# Patient Record
Sex: FEMALE | Race: WHITE | NOT HISPANIC OR LATINO | Employment: OTHER | RURAL
[De-identification: names, ages, dates, MRNs, and addresses within clinical notes are randomized per-mention and may not be internally consistent; named-entity substitution may affect disease eponyms.]

---

## 2018-10-25 ENCOUNTER — HISTORICAL (OUTPATIENT)
Dept: ADMINISTRATIVE | Facility: HOSPITAL | Age: 63
End: 2018-10-25

## 2018-10-26 LAB
LAB AP CLINICAL INFORMATION: NORMAL
LAB AP DIAGNOSIS - HISTORICAL: NORMAL
LAB AP GROSS PATHOLOGY - HISTORICAL: NORMAL
LAB AP SPECIMEN SUBMITTED - HISTORICAL: NORMAL

## 2018-10-31 ENCOUNTER — HISTORICAL (OUTPATIENT)
Dept: ADMINISTRATIVE | Facility: HOSPITAL | Age: 63
End: 2018-10-31

## 2020-06-25 ENCOUNTER — HISTORICAL (OUTPATIENT)
Dept: ADMINISTRATIVE | Facility: HOSPITAL | Age: 65
End: 2020-06-25

## 2021-04-28 RX ORDER — ASPIRIN 81 MG/1
81 TABLET ORAL DAILY
COMMUNITY

## 2021-04-28 RX ORDER — LOSARTAN POTASSIUM AND HYDROCHLOROTHIAZIDE 12.5; 1 MG/1; MG/1
1 TABLET ORAL DAILY
COMMUNITY
End: 2021-07-01 | Stop reason: SDUPTHER

## 2021-04-28 RX ORDER — TRIMETHOBENZAMIDE HCL 300 MG
300 CAPSULE ORAL EVERY 8 HOURS PRN
COMMUNITY

## 2021-04-28 RX ORDER — PANTOPRAZOLE SODIUM 40 MG/1
40 TABLET, DELAYED RELEASE ORAL DAILY
COMMUNITY
End: 2021-05-04 | Stop reason: SDUPTHER

## 2021-04-28 RX ORDER — METOPROLOL SUCCINATE 50 MG/1
50 TABLET, EXTENDED RELEASE ORAL DAILY
COMMUNITY
End: 2021-07-01 | Stop reason: SDUPTHER

## 2021-04-28 RX ORDER — DULOXETIN HYDROCHLORIDE 60 MG/1
60 CAPSULE, DELAYED RELEASE ORAL NIGHTLY
COMMUNITY
End: 2022-02-09 | Stop reason: SDUPTHER

## 2021-04-28 RX ORDER — FERROUS SULFATE 325(65) MG
325 TABLET ORAL DAILY
COMMUNITY

## 2021-04-28 RX ORDER — CLOPIDOGREL BISULFATE 75 MG/1
75 TABLET ORAL DAILY
Status: ON HOLD | COMMUNITY
End: 2021-05-29 | Stop reason: HOSPADM

## 2021-04-28 RX ORDER — LANOLIN ALCOHOL/MO/W.PET/CERES
1000 CREAM (GRAM) TOPICAL DAILY
COMMUNITY

## 2021-04-28 RX ORDER — ACETAMINOPHEN 500 MG
1 TABLET ORAL DAILY
COMMUNITY

## 2021-05-04 RX ORDER — PANTOPRAZOLE SODIUM 40 MG/1
40 TABLET, DELAYED RELEASE ORAL DAILY
Qty: 30 TABLET | Refills: 1 | Status: SHIPPED | OUTPATIENT
Start: 2021-05-04 | End: 2021-08-09 | Stop reason: SDUPTHER

## 2021-05-27 ENCOUNTER — HOSPITAL ENCOUNTER (INPATIENT)
Facility: HOSPITAL | Age: 66
LOS: 1 days | Discharge: HOME | DRG: 247 | End: 2021-05-29
Attending: STUDENT IN AN ORGANIZED HEALTH CARE EDUCATION/TRAINING PROGRAM | Admitting: INTERNAL MEDICINE
Payer: MEDICARE

## 2021-05-27 DIAGNOSIS — F17.200 TOBACCO USE DISORDER: ICD-10-CM

## 2021-05-27 DIAGNOSIS — R07.9 CHEST PAIN: ICD-10-CM

## 2021-05-27 DIAGNOSIS — I65.29 CAROTID STENOSIS: ICD-10-CM

## 2021-05-27 DIAGNOSIS — G45.1 CAROTID INSUFFICIENCY: ICD-10-CM

## 2021-05-27 DIAGNOSIS — J44.9 CHRONIC OBSTRUCTIVE PULMONARY DISEASE, UNSPECIFIED COPD TYPE: ICD-10-CM

## 2021-05-27 DIAGNOSIS — I10 ESSENTIAL HYPERTENSION: ICD-10-CM

## 2021-05-27 DIAGNOSIS — I65.23 BILATERAL CAROTID ARTERY STENOSIS: ICD-10-CM

## 2021-05-27 DIAGNOSIS — J44.9 COPD (CHRONIC OBSTRUCTIVE PULMONARY DISEASE): ICD-10-CM

## 2021-05-27 DIAGNOSIS — I21.4 NSTEMI (NON-ST ELEVATED MYOCARDIAL INFARCTION): Primary | ICD-10-CM

## 2021-05-27 DIAGNOSIS — I25.10 CAD (CORONARY ATHEROSCLEROTIC DISEASE): ICD-10-CM

## 2021-05-27 LAB
ALBUMIN SERPL BCP-MCNC: 3.7 G/DL (ref 3.5–5)
ALBUMIN/GLOB SERPL: 1.1 {RATIO}
ALP SERPL-CCNC: 94 U/L (ref 55–142)
ALT SERPL W P-5'-P-CCNC: 17 U/L (ref 13–56)
ANION GAP SERPL CALCULATED.3IONS-SCNC: 13 MMOL/L (ref 7–16)
AST SERPL W P-5'-P-CCNC: 14 U/L (ref 15–37)
BASOPHILS # BLD AUTO: 0.06 K/UL (ref 0–0.2)
BASOPHILS NFR BLD AUTO: 0.6 % (ref 0–1)
BILIRUB SERPL-MCNC: 0.3 MG/DL (ref 0–1.2)
BUN SERPL-MCNC: 16 MG/DL (ref 7–18)
BUN/CREAT SERPL: 13 (ref 6–20)
CALCIUM SERPL-MCNC: 9.1 MG/DL (ref 8.5–10.1)
CHLORIDE SERPL-SCNC: 100 MMOL/L (ref 98–107)
CO2 SERPL-SCNC: 33 MMOL/L (ref 21–32)
CREAT SERPL-MCNC: 1.26 MG/DL (ref 0.55–1.02)
DIFFERENTIAL METHOD BLD: ABNORMAL
EOSINOPHIL # BLD AUTO: 0.12 K/UL (ref 0–0.5)
EOSINOPHIL NFR BLD AUTO: 1.1 % (ref 1–4)
ERYTHROCYTE [DISTWIDTH] IN BLOOD BY AUTOMATED COUNT: 13.8 % (ref 11.5–14.5)
GLOBULIN SER-MCNC: 3.3 G/DL (ref 2–4)
GLUCOSE SERPL-MCNC: 122 MG/DL (ref 74–106)
HCT VFR BLD AUTO: 36.7 % (ref 38–47)
HGB BLD-MCNC: 12.1 G/DL (ref 12–16)
IMM GRANULOCYTES # BLD AUTO: 0.06 K/UL (ref 0–0.04)
IMM GRANULOCYTES NFR BLD: 0.6 % (ref 0–0.4)
LYMPHOCYTES # BLD AUTO: 2.47 K/UL (ref 1–4.8)
LYMPHOCYTES NFR BLD AUTO: 23.5 % (ref 27–41)
MCH RBC QN AUTO: 29.5 PG (ref 27–31)
MCHC RBC AUTO-ENTMCNC: 33 G/DL (ref 32–36)
MCV RBC AUTO: 89.5 FL (ref 80–96)
MONOCYTES # BLD AUTO: 0.65 K/UL (ref 0–0.8)
MONOCYTES NFR BLD AUTO: 6.2 % (ref 2–6)
MPC BLD CALC-MCNC: 10.2 FL (ref 9.4–12.4)
NEUTROPHILS # BLD AUTO: 7.14 K/UL (ref 1.8–7.7)
NEUTROPHILS NFR BLD AUTO: 68 % (ref 53–65)
NRBC # BLD AUTO: 0 X10E3/UL
NRBC, AUTO (.00): 0 %
NT-PROBNP SERPL-MCNC: 2679 PG/ML (ref 1–125)
PLATELET # BLD AUTO: 208 K/UL (ref 150–400)
POTASSIUM SERPL-SCNC: 3.7 MMOL/L (ref 3.5–5.1)
PROT SERPL-MCNC: 7 G/DL (ref 6.4–8.2)
RBC # BLD AUTO: 4.1 M/UL (ref 4.2–5.4)
SODIUM SERPL-SCNC: 142 MMOL/L (ref 136–145)
TROPONIN I SERPL-MCNC: 0.23 NG/ML
WBC # BLD AUTO: 10.5 K/UL (ref 4.5–11)

## 2021-05-27 PROCEDURE — 25000003 PHARM REV CODE 250: Performed by: EMERGENCY MEDICINE

## 2021-05-27 PROCEDURE — 36415 COLL VENOUS BLD VENIPUNCTURE: CPT | Performed by: STUDENT IN AN ORGANIZED HEALTH CARE EDUCATION/TRAINING PROGRAM

## 2021-05-27 PROCEDURE — 93010 EKG 12-LEAD: ICD-10-PCS | Mod: ,,, | Performed by: STUDENT IN AN ORGANIZED HEALTH CARE EDUCATION/TRAINING PROGRAM

## 2021-05-27 PROCEDURE — 36415 COLL VENOUS BLD VENIPUNCTURE: CPT | Performed by: EMERGENCY MEDICINE

## 2021-05-27 PROCEDURE — 93010 ELECTROCARDIOGRAM REPORT: CPT | Mod: ,,, | Performed by: STUDENT IN AN ORGANIZED HEALTH CARE EDUCATION/TRAINING PROGRAM

## 2021-05-27 PROCEDURE — 85025 COMPLETE CBC W/AUTO DIFF WBC: CPT | Performed by: EMERGENCY MEDICINE

## 2021-05-27 PROCEDURE — 99285 PR EMERGENCY DEPT VISIT,LEVEL V: ICD-10-PCS | Mod: ,,, | Performed by: EMERGENCY MEDICINE

## 2021-05-27 PROCEDURE — 99285 EMERGENCY DEPT VISIT HI MDM: CPT | Mod: ,,, | Performed by: EMERGENCY MEDICINE

## 2021-05-27 PROCEDURE — 80053 COMPREHEN METABOLIC PANEL: CPT | Performed by: EMERGENCY MEDICINE

## 2021-05-27 PROCEDURE — 83880 ASSAY OF NATRIURETIC PEPTIDE: CPT | Performed by: EMERGENCY MEDICINE

## 2021-05-27 PROCEDURE — 84484 ASSAY OF TROPONIN QUANT: CPT | Performed by: EMERGENCY MEDICINE

## 2021-05-27 PROCEDURE — 99285 EMERGENCY DEPT VISIT HI MDM: CPT | Mod: 25

## 2021-05-27 PROCEDURE — 93005 ELECTROCARDIOGRAM TRACING: CPT

## 2021-05-27 RX ORDER — HYDRALAZINE HYDROCHLORIDE 10 MG/1
TABLET, FILM COATED ORAL
Qty: 90 TABLET | Refills: 3 | Status: SHIPPED | OUTPATIENT
Start: 2021-05-27 | End: 2021-07-01

## 2021-05-27 RX ORDER — ASPIRIN 325 MG
325 TABLET ORAL
Status: COMPLETED | OUTPATIENT
Start: 2021-05-27 | End: 2021-05-27

## 2021-05-27 RX ADMIN — ASPIRIN 325 MG ORAL TABLET 325 MG: 325 PILL ORAL at 07:05

## 2021-05-28 PROBLEM — I65.23 BILATERAL CAROTID ARTERY STENOSIS: Status: ACTIVE | Noted: 2021-05-28

## 2021-05-28 PROBLEM — I21.4 NSTEMI (NON-ST ELEVATED MYOCARDIAL INFARCTION): Status: ACTIVE | Noted: 2021-05-28

## 2021-05-28 PROBLEM — J44.9 COPD (CHRONIC OBSTRUCTIVE PULMONARY DISEASE): Status: ACTIVE | Noted: 2021-05-28

## 2021-05-28 PROBLEM — I10 ESSENTIAL HYPERTENSION: Status: ACTIVE | Noted: 2021-05-28

## 2021-05-28 LAB
APTT PPP: 21.2 SECONDS (ref 25.2–37.3)
APTT PPP: 36 SECONDS (ref 25.2–37.3)
APTT PPP: 40.5 SECONDS (ref 25.2–37.3)
APTT PPP: >200 SECONDS (ref 25.2–37.3)
BASOPHILS # BLD AUTO: 0.04 K/UL (ref 0–0.2)
BASOPHILS # BLD AUTO: 0.05 K/UL (ref 0–0.2)
BASOPHILS NFR BLD AUTO: 0.4 % (ref 0–1)
BASOPHILS NFR BLD AUTO: 0.5 % (ref 0–1)
CATH EF QUANTITATIVE: 65 %
DIFFERENTIAL METHOD BLD: ABNORMAL
DIFFERENTIAL METHOD BLD: ABNORMAL
EOSINOPHIL # BLD AUTO: 0.11 K/UL (ref 0–0.5)
EOSINOPHIL # BLD AUTO: 0.11 K/UL (ref 0–0.5)
EOSINOPHIL NFR BLD AUTO: 1.1 % (ref 1–4)
EOSINOPHIL NFR BLD AUTO: 1.2 % (ref 1–4)
ERYTHROCYTE [DISTWIDTH] IN BLOOD BY AUTOMATED COUNT: 13.9 % (ref 11.5–14.5)
ERYTHROCYTE [DISTWIDTH] IN BLOOD BY AUTOMATED COUNT: 13.9 % (ref 11.5–14.5)
GLUCOSE SERPL-MCNC: 109 MG/DL (ref 70–105)
HCT VFR BLD AUTO: 34.9 % (ref 38–47)
HCT VFR BLD AUTO: 35.4 % (ref 38–47)
HGB BLD-MCNC: 11.4 G/DL (ref 12–16)
HGB BLD-MCNC: 11.4 G/DL (ref 12–16)
IMM GRANULOCYTES # BLD AUTO: 0.04 K/UL (ref 0–0.04)
IMM GRANULOCYTES # BLD AUTO: 0.05 K/UL (ref 0–0.04)
IMM GRANULOCYTES NFR BLD: 0.4 % (ref 0–0.4)
IMM GRANULOCYTES NFR BLD: 0.5 % (ref 0–0.4)
INR BLD: 0.97 (ref 0.9–1.1)
LYMPHOCYTES # BLD AUTO: 2.42 K/UL (ref 1–4.8)
LYMPHOCYTES # BLD AUTO: 2.56 K/UL (ref 1–4.8)
LYMPHOCYTES NFR BLD AUTO: 25 % (ref 27–41)
LYMPHOCYTES NFR BLD AUTO: 27.7 % (ref 27–41)
MCH RBC QN AUTO: 29.5 PG (ref 27–31)
MCH RBC QN AUTO: 29.7 PG (ref 27–31)
MCHC RBC AUTO-ENTMCNC: 32.2 G/DL (ref 32–36)
MCHC RBC AUTO-ENTMCNC: 32.7 G/DL (ref 32–36)
MCV RBC AUTO: 90.2 FL (ref 80–96)
MCV RBC AUTO: 92.2 FL (ref 80–96)
MONOCYTES # BLD AUTO: 0.46 K/UL (ref 0–0.8)
MONOCYTES # BLD AUTO: 0.58 K/UL (ref 0–0.8)
MONOCYTES NFR BLD AUTO: 4.8 % (ref 2–6)
MONOCYTES NFR BLD AUTO: 6.3 % (ref 2–6)
MPC BLD CALC-MCNC: 9.7 FL (ref 9.4–12.4)
MPC BLD CALC-MCNC: 9.8 FL (ref 9.4–12.4)
NEUTROPHILS # BLD AUTO: 5.89 K/UL (ref 1.8–7.7)
NEUTROPHILS # BLD AUTO: 6.59 K/UL (ref 1.8–7.7)
NEUTROPHILS NFR BLD AUTO: 63.9 % (ref 53–65)
NEUTROPHILS NFR BLD AUTO: 68.2 % (ref 53–65)
NRBC # BLD AUTO: 0 X10E3/UL
NRBC # BLD AUTO: 0 X10E3/UL
NRBC, AUTO (.00): 0 %
NRBC, AUTO (.00): 0 %
PLATELET # BLD AUTO: 164 K/UL (ref 150–400)
PLATELET # BLD AUTO: 173 K/UL (ref 150–400)
PROTHROMBIN TIME: 12.4 SECONDS (ref 11.7–14.7)
RBC # BLD AUTO: 3.84 M/UL (ref 4.2–5.4)
RBC # BLD AUTO: 3.87 M/UL (ref 4.2–5.4)
TROPONIN I SERPL-MCNC: 0.29 NG/ML
WBC # BLD AUTO: 9.23 K/UL (ref 4.5–11)
WBC # BLD AUTO: 9.67 K/UL (ref 4.5–11)

## 2021-05-28 PROCEDURE — C1887 CATHETER, GUIDING: HCPCS | Performed by: INTERNAL MEDICINE

## 2021-05-28 PROCEDURE — 36415 COLL VENOUS BLD VENIPUNCTURE: CPT | Performed by: EMERGENCY MEDICINE

## 2021-05-28 PROCEDURE — C1874 STENT, COATED/COV W/DEL SYS: HCPCS | Performed by: INTERNAL MEDICINE

## 2021-05-28 PROCEDURE — 27100168 OPTIME MED/SURG SUP & DEVICES NON-STERILE SUPPLY: Performed by: INTERNAL MEDICINE

## 2021-05-28 PROCEDURE — 82962 GLUCOSE BLOOD TEST: CPT

## 2021-05-28 PROCEDURE — 85025 COMPLETE CBC W/AUTO DIFF WBC: CPT | Performed by: EMERGENCY MEDICINE

## 2021-05-28 PROCEDURE — 94761 N-INVAS EAR/PLS OXIMETRY MLT: CPT

## 2021-05-28 PROCEDURE — 99153 MOD SED SAME PHYS/QHP EA: CPT | Performed by: INTERNAL MEDICINE

## 2021-05-28 PROCEDURE — 92978 ENDOLUMINL IVUS OCT C 1ST: CPT | Performed by: INTERNAL MEDICINE

## 2021-05-28 PROCEDURE — 93005 ELECTROCARDIOGRAM TRACING: CPT

## 2021-05-28 PROCEDURE — 93458 L HRT ARTERY/VENTRICLE ANGIO: CPT | Performed by: INTERNAL MEDICINE

## 2021-05-28 PROCEDURE — 99233 PR SUBSEQUENT HOSPITAL CARE,LEVL III: ICD-10-PCS | Mod: ,,, | Performed by: HOSPITALIST

## 2021-05-28 PROCEDURE — C1894 INTRO/SHEATH, NON-LASER: HCPCS | Performed by: INTERNAL MEDICINE

## 2021-05-28 PROCEDURE — 27000221 HC OXYGEN, UP TO 24 HOURS

## 2021-05-28 PROCEDURE — 99233 SBSQ HOSP IP/OBS HIGH 50: CPT | Mod: ,,, | Performed by: HOSPITALIST

## 2021-05-28 PROCEDURE — C1753 CATH, INTRAVAS ULTRASOUND: HCPCS | Performed by: INTERNAL MEDICINE

## 2021-05-28 PROCEDURE — G0378 HOSPITAL OBSERVATION PER HR: HCPCS

## 2021-05-28 PROCEDURE — 93458 PR CATH PLACE/CORON ANGIO, IMG SUPER/INTERP,W LEFT HEART VENTRICULOGRAPHY: ICD-10-PCS | Mod: 26,XU,, | Performed by: INTERNAL MEDICINE

## 2021-05-28 PROCEDURE — 25500020 PHARM REV CODE 255: Performed by: INTERNAL MEDICINE

## 2021-05-28 PROCEDURE — C1725 CATH, TRANSLUMIN NON-LASER: HCPCS | Performed by: INTERNAL MEDICINE

## 2021-05-28 PROCEDURE — 25000003 PHARM REV CODE 250: Performed by: INTERNAL MEDICINE

## 2021-05-28 PROCEDURE — 92978 ENDOLUMINL IVUS OCT C 1ST: CPT | Mod: 26,,, | Performed by: INTERNAL MEDICINE

## 2021-05-28 PROCEDURE — 20000000 HC ICU ROOM

## 2021-05-28 PROCEDURE — 92928 PRQ TCAT PLMT NTRAC ST 1 LES: CPT | Mod: LD,,, | Performed by: INTERNAL MEDICINE

## 2021-05-28 PROCEDURE — 99152 MOD SED SAME PHYS/QHP 5/>YRS: CPT | Performed by: INTERNAL MEDICINE

## 2021-05-28 PROCEDURE — 63600175 PHARM REV CODE 636 W HCPCS: Performed by: INTERNAL MEDICINE

## 2021-05-28 PROCEDURE — 85610 PROTHROMBIN TIME: CPT | Performed by: EMERGENCY MEDICINE

## 2021-05-28 PROCEDURE — 85730 THROMBOPLASTIN TIME PARTIAL: CPT | Mod: 91 | Performed by: EMERGENCY MEDICINE

## 2021-05-28 PROCEDURE — 92928 PR STENT: ICD-10-PCS | Mod: LD,,, | Performed by: INTERNAL MEDICINE

## 2021-05-28 PROCEDURE — 27201423 OPTIME MED/SURG SUP & DEVICES STERILE SUPPLY: Performed by: INTERNAL MEDICINE

## 2021-05-28 PROCEDURE — 63600175 PHARM REV CODE 636 W HCPCS: Performed by: EMERGENCY MEDICINE

## 2021-05-28 PROCEDURE — C1769 GUIDE WIRE: HCPCS | Performed by: INTERNAL MEDICINE

## 2021-05-28 PROCEDURE — 92978 PR IVUS, CORONARY, 1ST VESSEL: ICD-10-PCS | Mod: 26,,, | Performed by: INTERNAL MEDICINE

## 2021-05-28 PROCEDURE — 27800903 OPTIME MED/SURG SUP & DEVICES OTHER IMPLANTS: Performed by: INTERNAL MEDICINE

## 2021-05-28 PROCEDURE — 27000080 OPTIME MED/SURG SUP & DEVICES GENERAL CLASSIFICATION: Performed by: INTERNAL MEDICINE

## 2021-05-28 PROCEDURE — C9600 PERC DRUG-EL COR STENT SING: HCPCS | Mod: LD | Performed by: INTERNAL MEDICINE

## 2021-05-28 PROCEDURE — 93458 L HRT ARTERY/VENTRICLE ANGIO: CPT | Mod: 26,XU,, | Performed by: INTERNAL MEDICINE

## 2021-05-28 PROCEDURE — 85730 THROMBOPLASTIN TIME PARTIAL: CPT | Mod: 91 | Performed by: HOSPITALIST

## 2021-05-28 PROCEDURE — 93010 EKG 12-LEAD: ICD-10-PCS | Mod: ,,, | Performed by: STUDENT IN AN ORGANIZED HEALTH CARE EDUCATION/TRAINING PROGRAM

## 2021-05-28 PROCEDURE — 93010 ELECTROCARDIOGRAM REPORT: CPT | Mod: ,,, | Performed by: STUDENT IN AN ORGANIZED HEALTH CARE EDUCATION/TRAINING PROGRAM

## 2021-05-28 DEVICE — XIENCE SIERRA™ EVEROLIMUS ELUTING CORONARY STENT SYSTEM 4.00 MM X 18 MM / RAPID-EXCHANGE
Type: IMPLANTABLE DEVICE | Site: CORONARY | Status: FUNCTIONAL
Brand: XIENCE SIERRA™

## 2021-05-28 RX ORDER — DULOXETIN HYDROCHLORIDE 30 MG/1
60 CAPSULE, DELAYED RELEASE ORAL NIGHTLY
Status: DISCONTINUED | OUTPATIENT
Start: 2021-05-28 | End: 2021-05-29 | Stop reason: HOSPADM

## 2021-05-28 RX ORDER — HYDROCHLOROTHIAZIDE 12.5 MG/1
12.5 TABLET ORAL DAILY
Status: DISCONTINUED | OUTPATIENT
Start: 2021-05-28 | End: 2021-05-29 | Stop reason: HOSPADM

## 2021-05-28 RX ORDER — HEPARIN SODIUM 5000 [USP'U]/ML
INJECTION, SOLUTION INTRAVENOUS; SUBCUTANEOUS
Status: DISCONTINUED | OUTPATIENT
Start: 2021-05-28 | End: 2021-05-28 | Stop reason: HOSPADM

## 2021-05-28 RX ORDER — LOSARTAN POTASSIUM AND HYDROCHLOROTHIAZIDE 12.5; 5 MG/1; MG/1
1 TABLET ORAL DAILY
Status: DISCONTINUED | OUTPATIENT
Start: 2021-05-28 | End: 2021-05-28 | Stop reason: CLARIF

## 2021-05-28 RX ORDER — SODIUM CHLORIDE 9 MG/ML
INJECTION, SOLUTION INTRAVENOUS
Status: DISCONTINUED | OUTPATIENT
Start: 2021-05-28 | End: 2021-05-29 | Stop reason: HOSPADM

## 2021-05-28 RX ORDER — TALC
6 POWDER (GRAM) TOPICAL NIGHTLY PRN
Status: DISCONTINUED | OUTPATIENT
Start: 2021-05-28 | End: 2021-05-29 | Stop reason: HOSPADM

## 2021-05-28 RX ORDER — HYDRALAZINE HYDROCHLORIDE 10 MG/1
10 TABLET, FILM COATED ORAL DAILY
Status: DISCONTINUED | OUTPATIENT
Start: 2021-05-28 | End: 2021-05-29 | Stop reason: HOSPADM

## 2021-05-28 RX ORDER — LIDOCAINE HYDROCHLORIDE 10 MG/ML
INJECTION, SOLUTION EPIDURAL; INFILTRATION; INTRACAUDAL; PERINEURAL
Status: DISCONTINUED | OUTPATIENT
Start: 2021-05-28 | End: 2021-05-28 | Stop reason: HOSPADM

## 2021-05-28 RX ORDER — SODIUM CHLORIDE 9 MG/ML
INJECTION, SOLUTION INTRAVENOUS CONTINUOUS
Status: DISCONTINUED | OUTPATIENT
Start: 2021-05-28 | End: 2021-05-29 | Stop reason: HOSPADM

## 2021-05-28 RX ORDER — METOPROLOL SUCCINATE 50 MG/1
50 TABLET, EXTENDED RELEASE ORAL DAILY
Status: DISCONTINUED | OUTPATIENT
Start: 2021-05-28 | End: 2021-05-29 | Stop reason: HOSPADM

## 2021-05-28 RX ORDER — ONDANSETRON 4 MG/1
8 TABLET, ORALLY DISINTEGRATING ORAL EVERY 8 HOURS PRN
Status: DISCONTINUED | OUTPATIENT
Start: 2021-05-28 | End: 2021-05-29 | Stop reason: HOSPADM

## 2021-05-28 RX ORDER — HEPARIN SODIUM 200 [USP'U]/100ML
INJECTION, SOLUTION INTRAVENOUS
Status: DISCONTINUED | OUTPATIENT
Start: 2021-05-28 | End: 2021-05-29 | Stop reason: HOSPADM

## 2021-05-28 RX ORDER — CLOPIDOGREL BISULFATE 75 MG/1
75 TABLET ORAL DAILY
Status: DISCONTINUED | OUTPATIENT
Start: 2021-05-28 | End: 2021-05-28

## 2021-05-28 RX ORDER — ACETAMINOPHEN 325 MG/1
650 TABLET ORAL EVERY 4 HOURS PRN
Status: DISCONTINUED | OUTPATIENT
Start: 2021-05-28 | End: 2021-05-29 | Stop reason: HOSPADM

## 2021-05-28 RX ORDER — ASPIRIN 81 MG/1
81 TABLET ORAL DAILY
Status: DISCONTINUED | OUTPATIENT
Start: 2021-05-28 | End: 2021-05-28

## 2021-05-28 RX ORDER — ATORVASTATIN CALCIUM 80 MG/1
80 TABLET, FILM COATED ORAL NIGHTLY
Status: DISCONTINUED | OUTPATIENT
Start: 2021-05-28 | End: 2021-05-29 | Stop reason: HOSPADM

## 2021-05-28 RX ORDER — FENTANYL CITRATE 50 UG/ML
INJECTION, SOLUTION INTRAMUSCULAR; INTRAVENOUS
Status: DISCONTINUED | OUTPATIENT
Start: 2021-05-28 | End: 2021-05-28 | Stop reason: HOSPADM

## 2021-05-28 RX ORDER — MIDAZOLAM HYDROCHLORIDE 1 MG/ML
INJECTION INTRAMUSCULAR; INTRAVENOUS
Status: DISCONTINUED | OUTPATIENT
Start: 2021-05-28 | End: 2021-05-28 | Stop reason: HOSPADM

## 2021-05-28 RX ORDER — FERROUS SULFATE 325(65) MG
325 TABLET ORAL DAILY
Status: DISCONTINUED | OUTPATIENT
Start: 2021-05-28 | End: 2021-05-29 | Stop reason: HOSPADM

## 2021-05-28 RX ORDER — ASPIRIN 81 MG/1
81 TABLET ORAL DAILY
Status: DISCONTINUED | OUTPATIENT
Start: 2021-05-29 | End: 2021-05-29 | Stop reason: HOSPADM

## 2021-05-28 RX ORDER — SODIUM CHLORIDE 0.9 % (FLUSH) 0.9 %
10 SYRINGE (ML) INJECTION
Status: DISCONTINUED | OUTPATIENT
Start: 2021-05-28 | End: 2021-05-29 | Stop reason: HOSPADM

## 2021-05-28 RX ORDER — HEPARIN SODIUM,PORCINE/D5W 25000/250
0-40 INTRAVENOUS SOLUTION INTRAVENOUS CONTINUOUS
Status: DISCONTINUED | OUTPATIENT
Start: 2021-05-28 | End: 2021-05-28

## 2021-05-28 RX ORDER — NITROGLYCERIN 0.4 MG/1
0.4 TABLET SUBLINGUAL EVERY 5 MIN PRN
Status: DISCONTINUED | OUTPATIENT
Start: 2021-05-28 | End: 2021-05-29 | Stop reason: HOSPADM

## 2021-05-28 RX ORDER — LOSARTAN POTASSIUM 50 MG/1
50 TABLET ORAL DAILY
Status: DISCONTINUED | OUTPATIENT
Start: 2021-05-28 | End: 2021-05-29 | Stop reason: HOSPADM

## 2021-05-28 RX ORDER — HYDRALAZINE HYDROCHLORIDE 20 MG/ML
INJECTION INTRAMUSCULAR; INTRAVENOUS
Status: DISCONTINUED | OUTPATIENT
Start: 2021-05-28 | End: 2021-05-28 | Stop reason: HOSPADM

## 2021-05-28 RX ADMIN — SODIUM CHLORIDE: 9 INJECTION, SOLUTION INTRAVENOUS at 08:05

## 2021-05-28 RX ADMIN — LOSARTAN POTASSIUM 50 MG: 50 TABLET, FILM COATED ORAL at 09:05

## 2021-05-28 RX ADMIN — TICAGRELOR 90 MG: 90 TABLET ORAL at 08:05

## 2021-05-28 RX ADMIN — HYDROCHLOROTHIAZIDE 12.5 MG: 12.5 TABLET ORAL at 09:05

## 2021-05-28 RX ADMIN — METOPROLOL SUCCINATE 50 MG: 50 TABLET, FILM COATED, EXTENDED RELEASE ORAL at 09:05

## 2021-05-28 RX ADMIN — FERROUS SULFATE TAB 325 MG (65 MG ELEMENTAL FE) 325 MG: 325 (65 FE) TAB at 09:05

## 2021-05-28 RX ADMIN — DULOXETINE 60 MG: 30 CAPSULE, DELAYED RELEASE ORAL at 08:05

## 2021-05-28 RX ADMIN — SODIUM CHLORIDE: 9 INJECTION, SOLUTION INTRAVENOUS at 05:05

## 2021-05-28 RX ADMIN — HEPARIN SODIUM 12 UNITS/KG/HR: 10000 INJECTION, SOLUTION INTRAVENOUS at 01:05

## 2021-05-28 RX ADMIN — HYDRALAZINE HYDROCHLORIDE 10 MG: 10 TABLET, FILM COATED ORAL at 09:05

## 2021-05-29 VITALS
WEIGHT: 149.69 LBS | SYSTOLIC BLOOD PRESSURE: 138 MMHG | HEART RATE: 76 BPM | DIASTOLIC BLOOD PRESSURE: 48 MMHG | HEIGHT: 62 IN | TEMPERATURE: 99 F | OXYGEN SATURATION: 99 % | BODY MASS INDEX: 27.55 KG/M2 | RESPIRATION RATE: 10 BRPM

## 2021-05-29 PROCEDURE — 94761 N-INVAS EAR/PLS OXIMETRY MLT: CPT

## 2021-05-29 PROCEDURE — 25000003 PHARM REV CODE 250: Performed by: INTERNAL MEDICINE

## 2021-05-29 PROCEDURE — 27000221 HC OXYGEN, UP TO 24 HOURS

## 2021-05-29 PROCEDURE — 99239 HOSP IP/OBS DSCHRG MGMT >30: CPT | Mod: ,,, | Performed by: HOSPITALIST

## 2021-05-29 PROCEDURE — 99239 PR HOSPITAL DISCHARGE DAY,>30 MIN: ICD-10-PCS | Mod: ,,, | Performed by: HOSPITALIST

## 2021-05-29 RX ORDER — ATORVASTATIN CALCIUM 80 MG/1
80 TABLET, FILM COATED ORAL NIGHTLY
Qty: 30 TABLET | Refills: 3 | Status: SHIPPED | OUTPATIENT
Start: 2021-05-29 | End: 2021-06-07

## 2021-05-29 RX ADMIN — ASPIRIN 81 MG: 81 TABLET, COATED ORAL at 09:05

## 2021-05-29 RX ADMIN — LOSARTAN POTASSIUM 50 MG: 50 TABLET, FILM COATED ORAL at 09:05

## 2021-05-29 RX ADMIN — FERROUS SULFATE TAB 325 MG (65 MG ELEMENTAL FE) 325 MG: 325 (65 FE) TAB at 09:05

## 2021-05-29 RX ADMIN — METOPROLOL SUCCINATE 50 MG: 50 TABLET, FILM COATED, EXTENDED RELEASE ORAL at 09:05

## 2021-05-29 RX ADMIN — HYDROCHLOROTHIAZIDE 12.5 MG: 12.5 TABLET ORAL at 09:05

## 2021-05-29 RX ADMIN — TICAGRELOR 90 MG: 90 TABLET ORAL at 09:05

## 2021-05-29 RX ADMIN — HYDRALAZINE HYDROCHLORIDE 10 MG: 10 TABLET, FILM COATED ORAL at 09:05

## 2021-06-01 ENCOUNTER — TELEPHONE (OUTPATIENT)
Dept: PRIMARY CARE CLINIC | Facility: CLINIC | Age: 66
End: 2021-06-01

## 2021-06-04 LAB
POC ACTIVATED CLOTTING TIME K: 229 SEC
POC ACTIVATED CLOTTING TIME K: 349 SEC

## 2021-06-07 ENCOUNTER — OFFICE VISIT (OUTPATIENT)
Dept: PRIMARY CARE CLINIC | Facility: CLINIC | Age: 66
End: 2021-06-07
Payer: MEDICARE

## 2021-06-07 VITALS
DIASTOLIC BLOOD PRESSURE: 54 MMHG | WEIGHT: 152 LBS | HEART RATE: 66 BPM | RESPIRATION RATE: 16 BRPM | SYSTOLIC BLOOD PRESSURE: 102 MMHG | TEMPERATURE: 98 F | OXYGEN SATURATION: 96 % | HEIGHT: 62 IN | BODY MASS INDEX: 27.97 KG/M2

## 2021-06-07 DIAGNOSIS — Z09 HOSPITAL DISCHARGE FOLLOW-UP: Primary | ICD-10-CM

## 2021-06-07 DIAGNOSIS — I10 ESSENTIAL HYPERTENSION, BENIGN: ICD-10-CM

## 2021-06-07 PROBLEM — I25.10 CAD (CORONARY ATHEROSCLEROTIC DISEASE): Status: ACTIVE | Noted: 2019-10-07

## 2021-06-07 PROBLEM — F17.200 TOBACCO USE DISORDER: Status: ACTIVE | Noted: 2019-10-07

## 2021-06-07 PROBLEM — G45.1 CAROTID INSUFFICIENCY: Status: ACTIVE | Noted: 2021-06-07

## 2021-06-07 PROBLEM — I65.29 CAROTID STENOSIS: Status: ACTIVE | Noted: 2019-02-14

## 2021-06-07 PROCEDURE — 99212 PR OFFICE/OUTPT VISIT, EST, LEVL II, 10-19 MIN: ICD-10-PCS | Mod: ,,, | Performed by: NURSE PRACTITIONER

## 2021-06-07 PROCEDURE — 99212 OFFICE O/P EST SF 10 MIN: CPT | Mod: ,,, | Performed by: NURSE PRACTITIONER

## 2021-06-07 RX ORDER — PANTOPRAZOLE SODIUM 40 MG/1
TABLET, DELAYED RELEASE ORAL
COMMUNITY
Start: 2021-02-19 | End: 2021-06-07 | Stop reason: SDUPTHER

## 2021-06-07 RX ORDER — OMEGA-3-ACID ETHYL ESTERS 1 G/1
CAPSULE, LIQUID FILLED ORAL
COMMUNITY
Start: 2021-02-18

## 2021-06-07 RX ORDER — ACETAMINOPHEN, DEXTROMETHORPHAN HBR, DOXYLAMINE SUCCINATE, PHENYLEPHRINE HCL 650; 20; 12.5; 1 MG/30ML; MG/30ML; MG/30ML; MG/30ML
1000 SOLUTION ORAL
COMMUNITY
End: 2021-06-07 | Stop reason: SDUPTHER

## 2021-06-07 RX ORDER — LOSARTAN POTASSIUM AND HYDROCHLOROTHIAZIDE 12.5; 1 MG/1; MG/1
TABLET ORAL
COMMUNITY
Start: 2021-02-19 | End: 2021-06-07 | Stop reason: SDUPTHER

## 2021-06-07 RX ORDER — DULOXETIN HYDROCHLORIDE 60 MG/1
CAPSULE, DELAYED RELEASE ORAL
COMMUNITY
Start: 2021-02-11 | End: 2021-06-07 | Stop reason: SDUPTHER

## 2021-06-07 RX ORDER — CYANOCOBALAMIN (VITAMIN B-12) 500 MCG
1 TABLET ORAL
COMMUNITY
End: 2021-06-07 | Stop reason: SDUPTHER

## 2021-06-07 RX ORDER — METOPROLOL SUCCINATE 50 MG/1
TABLET, EXTENDED RELEASE ORAL
COMMUNITY
Start: 2021-02-19 | End: 2021-06-07 | Stop reason: SDUPTHER

## 2021-07-01 ENCOUNTER — OFFICE VISIT (OUTPATIENT)
Dept: CARDIOLOGY | Facility: CLINIC | Age: 66
End: 2021-07-01
Payer: MEDICARE

## 2021-07-01 VITALS
DIASTOLIC BLOOD PRESSURE: 70 MMHG | WEIGHT: 150.81 LBS | SYSTOLIC BLOOD PRESSURE: 120 MMHG | HEIGHT: 62 IN | RESPIRATION RATE: 14 BRPM | BODY MASS INDEX: 27.75 KG/M2 | HEART RATE: 78 BPM

## 2021-07-01 DIAGNOSIS — I25.10 CORONARY ARTERY DISEASE INVOLVING NATIVE HEART WITHOUT ANGINA PECTORIS, UNSPECIFIED VESSEL OR LESION TYPE: Primary | ICD-10-CM

## 2021-07-01 DIAGNOSIS — I10 ESSENTIAL HYPERTENSION, BENIGN: ICD-10-CM

## 2021-07-01 DIAGNOSIS — E78.00 HYPERCHOLESTEREMIA: ICD-10-CM

## 2021-07-01 DIAGNOSIS — J44.9 CHRONIC OBSTRUCTIVE PULMONARY DISEASE, UNSPECIFIED COPD TYPE: ICD-10-CM

## 2021-07-01 PROCEDURE — 99214 OFFICE O/P EST MOD 30 MIN: CPT | Mod: PBBFAC | Performed by: INTERNAL MEDICINE

## 2021-07-01 PROCEDURE — 99214 OFFICE O/P EST MOD 30 MIN: CPT | Mod: S$PBB,,, | Performed by: INTERNAL MEDICINE

## 2021-07-01 PROCEDURE — 99214 PR OFFICE/OUTPT VISIT, EST, LEVL IV, 30-39 MIN: ICD-10-PCS | Mod: S$PBB,,, | Performed by: INTERNAL MEDICINE

## 2021-07-01 RX ORDER — LOSARTAN POTASSIUM AND HYDROCHLOROTHIAZIDE 12.5; 1 MG/1; MG/1
1 TABLET ORAL DAILY
Qty: 90 TABLET | Refills: 3 | Status: SHIPPED | OUTPATIENT
Start: 2021-07-01 | End: 2022-08-16

## 2021-07-01 RX ORDER — METOPROLOL SUCCINATE 50 MG/1
50 TABLET, EXTENDED RELEASE ORAL DAILY
Qty: 90 TABLET | Refills: 3 | Status: SHIPPED | OUTPATIENT
Start: 2021-07-01 | End: 2021-09-27 | Stop reason: SDUPTHER

## 2021-07-09 RX ORDER — EZETIMIBE 10 MG/1
10 TABLET ORAL DAILY
Qty: 90 TABLET | Refills: 1 | Status: SHIPPED | OUTPATIENT
Start: 2021-07-09 | End: 2021-09-27 | Stop reason: SDUPTHER

## 2021-08-09 ENCOUNTER — OFFICE VISIT (OUTPATIENT)
Dept: INTERNAL MEDICINE | Facility: CLINIC | Age: 66
End: 2021-08-09
Payer: MEDICARE

## 2021-08-09 VITALS
WEIGHT: 140 LBS | SYSTOLIC BLOOD PRESSURE: 136 MMHG | HEART RATE: 75 BPM | DIASTOLIC BLOOD PRESSURE: 66 MMHG | HEIGHT: 62 IN | BODY MASS INDEX: 25.76 KG/M2 | OXYGEN SATURATION: 92 % | RESPIRATION RATE: 14 BRPM

## 2021-08-09 DIAGNOSIS — K21.9 GASTROESOPHAGEAL REFLUX DISEASE, UNSPECIFIED WHETHER ESOPHAGITIS PRESENT: ICD-10-CM

## 2021-08-09 DIAGNOSIS — Z76.89 ENCOUNTER TO ESTABLISH CARE WITH NEW DOCTOR: Primary | ICD-10-CM

## 2021-08-09 DIAGNOSIS — I10 ESSENTIAL HYPERTENSION: ICD-10-CM

## 2021-08-09 DIAGNOSIS — I25.10 CORONARY ARTERY DISEASE INVOLVING NATIVE CORONARY ARTERY OF NATIVE HEART WITHOUT ANGINA PECTORIS: ICD-10-CM

## 2021-08-09 DIAGNOSIS — E78.5 DYSLIPIDEMIA: ICD-10-CM

## 2021-08-09 PROCEDURE — 99214 PR OFFICE/OUTPT VISIT, EST, LEVL IV, 30-39 MIN: ICD-10-PCS | Mod: S$PBB,,, | Performed by: INTERNAL MEDICINE

## 2021-08-09 PROCEDURE — 99214 OFFICE O/P EST MOD 30 MIN: CPT | Mod: PBBFAC | Performed by: INTERNAL MEDICINE

## 2021-08-09 PROCEDURE — 99214 OFFICE O/P EST MOD 30 MIN: CPT | Mod: S$PBB,,, | Performed by: INTERNAL MEDICINE

## 2021-08-09 RX ORDER — PANTOPRAZOLE SODIUM 40 MG/1
40 TABLET, DELAYED RELEASE ORAL DAILY
Qty: 90 TABLET | Refills: 3 | Status: SHIPPED | OUTPATIENT
Start: 2021-08-09 | End: 2022-10-12

## 2021-08-23 DIAGNOSIS — Z79.899 ENCOUNTER FOR LONG-TERM (CURRENT) USE OF OTHER MEDICATIONS: ICD-10-CM

## 2021-08-23 DIAGNOSIS — E78.5 HYPERLIPIDEMIA, UNSPECIFIED HYPERLIPIDEMIA TYPE: Primary | ICD-10-CM

## 2021-09-27 RX ORDER — METOPROLOL SUCCINATE 50 MG/1
50 TABLET, EXTENDED RELEASE ORAL DAILY
Qty: 90 TABLET | Refills: 3 | Status: SHIPPED | OUTPATIENT
Start: 2021-09-27 | End: 2022-11-14

## 2021-09-27 RX ORDER — EZETIMIBE 10 MG/1
10 TABLET ORAL DAILY
Qty: 90 TABLET | Refills: 3 | Status: SHIPPED | OUTPATIENT
Start: 2021-09-27 | End: 2022-11-08

## 2021-11-02 ENCOUNTER — TELEPHONE (OUTPATIENT)
Dept: PULMONOLOGY | Facility: CLINIC | Age: 66
End: 2021-11-02
Payer: MEDICARE

## 2021-11-03 ENCOUNTER — CLINICAL SUPPORT (OUTPATIENT)
Dept: INTERNAL MEDICINE | Facility: CLINIC | Age: 66
End: 2021-11-03
Payer: MEDICARE

## 2021-11-03 DIAGNOSIS — Z23 ENCOUNTER FOR IMMUNIZATION: Primary | ICD-10-CM

## 2021-11-03 PROCEDURE — 90686 IIV4 VACC NO PRSV 0.5 ML IM: CPT | Mod: PBBFAC | Performed by: INTERNAL MEDICINE

## 2021-11-03 PROCEDURE — 99212 OFFICE O/P EST SF 10 MIN: CPT | Mod: PBBFAC,25 | Performed by: INTERNAL MEDICINE

## 2021-11-23 ENCOUNTER — PATIENT OUTREACH (OUTPATIENT)
Dept: PRIMARY CARE CLINIC | Facility: CLINIC | Age: 66
End: 2021-11-23
Payer: MEDICARE

## 2022-01-26 ENCOUNTER — OFFICE VISIT (OUTPATIENT)
Dept: CARDIOLOGY | Facility: CLINIC | Age: 67
End: 2022-01-26
Payer: MEDICARE

## 2022-01-26 VITALS
RESPIRATION RATE: 14 BRPM | BODY MASS INDEX: 29.77 KG/M2 | SYSTOLIC BLOOD PRESSURE: 128 MMHG | HEART RATE: 80 BPM | DIASTOLIC BLOOD PRESSURE: 62 MMHG | HEIGHT: 62 IN | WEIGHT: 161.75 LBS

## 2022-01-26 DIAGNOSIS — I10 ESSENTIAL HYPERTENSION: Chronic | ICD-10-CM

## 2022-01-26 DIAGNOSIS — J44.9 CHRONIC OBSTRUCTIVE PULMONARY DISEASE, UNSPECIFIED COPD TYPE: Chronic | ICD-10-CM

## 2022-01-26 DIAGNOSIS — E78.00 HYPERCHOLESTEREMIA: Chronic | ICD-10-CM

## 2022-01-26 DIAGNOSIS — I25.10 CORONARY ARTERY DISEASE INVOLVING NATIVE CORONARY ARTERY OF NATIVE HEART WITHOUT ANGINA PECTORIS: Primary | Chronic | ICD-10-CM

## 2022-01-26 DIAGNOSIS — R01.1 UNDIAGNOSED CARDIAC MURMURS: ICD-10-CM

## 2022-01-26 DIAGNOSIS — I65.23 BILATERAL CAROTID ARTERY STENOSIS: Chronic | ICD-10-CM

## 2022-01-26 DIAGNOSIS — M48.00 SPINAL STENOSIS, UNSPECIFIED SPINAL REGION: Chronic | ICD-10-CM

## 2022-01-26 PROCEDURE — 99214 PR OFFICE/OUTPT VISIT, EST, LEVL IV, 30-39 MIN: ICD-10-PCS | Mod: S$PBB,,, | Performed by: INTERNAL MEDICINE

## 2022-01-26 PROCEDURE — 93010 ELECTROCARDIOGRAM REPORT: CPT | Mod: S$PBB,,, | Performed by: INTERNAL MEDICINE

## 2022-01-26 PROCEDURE — 99214 OFFICE O/P EST MOD 30 MIN: CPT | Mod: PBBFAC | Performed by: INTERNAL MEDICINE

## 2022-01-26 PROCEDURE — 99214 OFFICE O/P EST MOD 30 MIN: CPT | Mod: S$PBB,,, | Performed by: INTERNAL MEDICINE

## 2022-01-26 PROCEDURE — 93010 EKG 12-LEAD: ICD-10-PCS | Mod: S$PBB,,, | Performed by: INTERNAL MEDICINE

## 2022-01-26 PROCEDURE — 93005 ELECTROCARDIOGRAM TRACING: CPT | Mod: PBBFAC | Performed by: INTERNAL MEDICINE

## 2022-01-26 RX ORDER — EVOLOCUMAB 140 MG/ML
140 INJECTION, SOLUTION SUBCUTANEOUS
Qty: 6 ML | Refills: 3 | Status: SHIPPED | OUTPATIENT
Start: 2022-01-26 | End: 2023-01-26

## 2022-01-27 PROBLEM — M48.00 SPINAL STENOSIS: Chronic | Status: ACTIVE | Noted: 2022-01-27

## 2022-01-27 PROBLEM — R01.1 UNDIAGNOSED CARDIAC MURMURS: Status: ACTIVE | Noted: 2022-01-27

## 2022-01-27 NOTE — PROGRESS NOTES
[facility] note        DATE OF SERVICE: 2022       PCP: ASHA Wright      CHIEF COMPLAINT:   Chief Complaint   Patient presents with    Follow-up     6 Months. Denies any cardiac complaints today.        HISTORY OF PRESENT ILLNESS:  Melody Foster is a 66 y.o. female with a PMH of   Past Medical History:   Diagnosis Date    Anxiety     COPD (chronic obstructive pulmonary disease)     Coronary artery disease     Hyperlipidemia     Hypertension     Iron deficiency anemia     Myocardial infarction 2021    NSTEMI    Vitamin D deficiency      who presents for   Chief Complaint   Patient presents with    Follow-up     6 Months. Denies any cardiac complaints today.          Review of Systems: Review of Systems   Respiratory: Negative for shortness of breath.    Cardiovascular: Negative for chest pain, palpitations and leg swelling.   Neurological: Negative for dizziness and loss of consciousness.        PAST MEDICAL HISTORY:  Past Medical History:   Diagnosis Date    Anxiety     COPD (chronic obstructive pulmonary disease)     Coronary artery disease     Hyperlipidemia     Hypertension     Iron deficiency anemia     Myocardial infarction 2021    NSTEMI    Vitamin D deficiency        PAST SURGICAL HISTORY:  Past Surgical History:   Procedure Laterality Date     SECTION      COLONOSCOPY      CORONARY ANGIOPLASTY WITH STENT PLACEMENT Left 2011    LEFT HEART CATHETERIZATION Left 2021    Procedure: Left heart cath;  Surgeon: Denis nAtonio MD;  Location: [facility] CATH LAB;  Service: Cardiology;  Laterality: Left;    TOTAL ABDOMINAL HYSTERECTOMY         SOCIAL HISTORY:  Social History     Socioeconomic History    Marital status:    Tobacco Use    Smoking status: Former Smoker     Packs/day: 0.50     Years: 30.00     Pack years: 15.00     Types: Cigarettes     Quit date: 2021     Years since quittin.7    Smokeless tobacco: Never  Used   Substance and Sexual Activity    Alcohol use: Never    Drug use: Never     Social Determinants of Health     Housing Stability: Unknown    Unable to Pay for Housing in the Last Year: No    Unstable Housing in the Last Year: No       FAMILY HISTORY:  Family History   Problem Relation Age of Onset    Anemia Mother     Crohn's disease Mother     Anxiety disorder Mother          ALLERGIES:  Review of patient's allergies indicates:   Allergen Reactions    Hydrocodone-acetaminophen Nausea And Vomiting        MEDICATIONS:    Current Outpatient Medications:     aspirin (ECOTRIN) 81 MG EC tablet, Take 81 mg by mouth once daily., Disp: , Rfl:     cholecalciferol, vitamin D3, (VITAMIN D3) 50 mcg (2,000 unit) Cap, Take 1 capsule by mouth once daily., Disp: , Rfl:     cyanocobalamin (VITAMIN B-12) 1000 MCG tablet, Take 1,000 mcg by mouth once daily., Disp: , Rfl:     DULoxetine (CYMBALTA) 60 MG capsule, Take 60 mg by mouth nightly., Disp: , Rfl:     evolocumab (REPATHA PUSHTRONEX) 420 mg/3.5 mL Injt, Inject 3.5 mLs (420 mg total) into the skin every 30 days., Disp: 3.5 mL, Rfl: 11    evolocumab (REPATHA SURECLICK) 140 mg/mL PnIj, Inject 1 mL (140 mg total) into the skin every 14 (fourteen) days., Disp: 6 mL, Rfl: 3    ezetimibe (ZETIA) 10 mg tablet, Take 1 tablet (10 mg total) by mouth once daily., Disp: 90 tablet, Rfl: 3    ferrous sulfate (FEOSOL) 325 mg (65 mg iron) Tab tablet, Take 325 mg by mouth once daily., Disp: , Rfl:     losartan-hydrochlorothiazide 100-12.5 mg (HYZAAR) 100-12.5 mg Tab, Take 1 tablet by mouth once daily., Disp: 90 tablet, Rfl: 3    metoprolol succinate (TOPROL-XL) 50 MG 24 hr tablet, Take 1 tablet (50 mg total) by mouth once daily., Disp: 90 tablet, Rfl: 3    omega-3 acid ethyl esters (LOVAZA) 1 gram capsule, , Disp: , Rfl:     pantoprazole (PROTONIX) 40 MG tablet, Take 1 tablet (40 mg total) by mouth once daily., Disp: 90 tablet, Rfl: 3    ticagrelor (BRILINTA) 90 mg  "tablet, Take 1 tablet (90 mg total) by mouth 2 (two) times daily., Disp: 180 tablet, Rfl: 3    trimethobenzamide (TIGAN) 300 mg capsule, Take 300 mg by mouth every 8 (eight) hours as needed., Disp: , Rfl:      PHYSICAL EXAM:  /62 (BP Location: Left arm, Patient Position: Sitting)   Pulse 80   Resp 14   Ht 5' 2" (1.575 m)   Wt 73.4 kg (161 lb 12 oz)   BMI 29.58 kg/m²   Wt Readings from Last 3 Encounters:   01/26/22 73.4 kg (161 lb 12 oz)   08/09/21 63.5 kg (140 lb)   07/01/21 68.4 kg (150 lb 12.8 oz)      Body mass index is 29.58 kg/m².    Physical Exam  Vitals reviewed.   Constitutional:       Appearance: Normal appearance.   HENT:      Head: Normocephalic and atraumatic.   Neck:      Vascular: Carotid bruit present. No JVD.      Comments: Bilateral carotid bruit  Cardiovascular:      Rate and Rhythm: Normal rate and regular rhythm.      Pulses: Normal pulses.           Radial pulses are 2+ on the right side and 2+ on the left side.        Dorsalis pedis pulses are 2+ on the right side and 2+ on the left side.      Heart sounds: Murmur heard.    Systolic murmur is present with a grade of 2/6.       Comments: II/ VI MICHAEL RUSB  Pulmonary:      Effort: Pulmonary effort is normal.      Breath sounds: Normal breath sounds.   Musculoskeletal:      Right lower leg: No edema.      Left lower leg: No edema.   Skin:     General: Skin is warm and dry.   Neurological:      Mental Status: She is alert.         LABS REVIEWED:  Lab Results   Component Value Date    WBC 9.23 05/28/2021    RBC 3.84 (L) 05/28/2021    HGB 11.4 (L) 05/28/2021    HCT 35.4 (L) 05/28/2021    MCV 92.2 05/28/2021    MCH 29.7 05/28/2021    MCHC 32.2 05/28/2021    RDW 13.9 05/28/2021     05/28/2021    MPV 9.8 05/28/2021    NRBC 0.0 05/28/2021    INR 0.97 05/28/2021     Lab Results   Component Value Date     05/27/2021    K 3.7 05/27/2021     05/27/2021    CO2 33 (H) 05/27/2021    BUN 16 05/27/2021     Lab Results   Component " Value Date    AST 14 (L) 05/27/2021    ALT 17 09/01/2021     Lab Results   Component Value Date     (H) 05/27/2021     Lab Results   Component Value Date    CHOL 217 (H) 09/01/2021    HDL 36 (L) 09/01/2021    TRIG 258 (H) 09/01/2021    CHOLHDL 6.0 09/01/2021       CARDIAC STUDIES REVIEWED:EKG: NSR, T wave abn, 85 bpm        ASSESSMENT:   Active Problem List with Overview Notes    Diagnosis Date Noted    Spinal stenosis 01/27/2022    Undiagnosed cardiac murmurs 01/27/2022    Gastroesophageal reflux disease 08/09/2021    Dyslipidemia 08/09/2021    Hypercholesteremia 07/01/2021    Carotid insufficiency 06/07/2021    NSTEMI (non-ST elevated myocardial infarction) 05/28/2021    COPD (chronic obstructive pulmonary disease) 05/28/2021    Bilateral carotid artery stenosis 05/28/2021    Essential hypertension 05/28/2021    Coronary artery disease involving native heart without angina pectoris 10/07/2019    Tobacco use disorder 10/07/2019    Carotid stenosis 02/14/2019     VISIT DIAGNOSIS:  Coronary artery disease involving native coronary artery of native heart without angina pectoris  Comments:  s/p NSTEMI 5/21  Orders:  -     EKG 12-lead; Future    Undiagnosed cardiac murmurs  -     Echo; Future; Expected date: 02/09/2022    Essential hypertension    Hypercholesteremia    Chronic obstructive pulmonary disease, unspecified COPD type    Bilateral carotid artery stenosis    Spinal stenosis, unspecified spinal region    Other orders  -     evolocumab (REPATHA SURECLICK) 140 mg/mL PnIj; Inject 1 mL (140 mg total) into the skin every 14 (fourteen) days.  Dispense: 6 mL; Refill: 3         PLAN:   1. Okay to come off dual antiplatelet therapy to have back surgery.  2. Mildly increased cardiac risk, continue beta blockers perioperatively.  3. Restart Repatha.     Orders Placed This Encounter   Procedures    EKG 12-lead     Standing Status:   Future     Number of Occurrences:   1     Standing Expiration Date:    1/26/2023     Order Specific Question:   Diagnosis     Answer:   CAD (coronary artery disease) [[id number]]    Echo     Standing Status:   Future     Standing Expiration Date:   2/26/2022     Order Specific Question:   Color Doppler?     Answer:   Yes     Order Specific Question:   Release to patient     Answer:   Immediate      RTC 6 months.

## 2022-01-28 ENCOUNTER — HOSPITAL ENCOUNTER (OUTPATIENT)
Dept: CARDIOLOGY | Facility: HOSPITAL | Age: 67
Discharge: HOME | End: 2022-01-28
Attending: INTERNAL MEDICINE
Payer: MEDICARE

## 2022-01-28 DIAGNOSIS — R01.1 UNDIAGNOSED CARDIAC MURMURS: ICD-10-CM

## 2022-01-28 PROCEDURE — 93306 ECHO (CUPID ONLY): ICD-10-PCS | Mod: 26,,, | Performed by: INTERNAL MEDICINE

## 2022-01-28 PROCEDURE — 93306 TTE W/DOPPLER COMPLETE: CPT | Mod: 26,,, | Performed by: INTERNAL MEDICINE

## 2022-01-28 PROCEDURE — 93306 TTE W/DOPPLER COMPLETE: CPT

## 2022-02-10 RX ORDER — DULOXETIN HYDROCHLORIDE 60 MG/1
60 CAPSULE, DELAYED RELEASE ORAL NIGHTLY
Qty: 90 CAPSULE | Refills: 3 | Status: SHIPPED | OUTPATIENT
Start: 2022-02-10 | End: 2023-04-24

## 2022-02-18 LAB
AV INDEX (PROSTH): 0.6
AV VALVE AREA: 1.36 CM2
CV ECHO LV RWT: 0.68 CM
DOP CALC AO VTI: 29.9 CM
DOP CALC LVOT AREA: 2.3 CM2
DOP CALC LVOT DIAMETER: 1.7 CM
DOP CALC LVOT STROKE VOLUME: 40.77 CM3
DOP CALCLVOT PEAK VEL VTI: 17.97 CM
ECHO LV POSTERIOR WALL: 1.5 CM (ref 0.6–1.1)
EJECTION FRACTION: 60 %
FRACTIONAL SHORTENING: 34 % (ref 28–44)
INTERVENTRICULAR SEPTUM: 1.3 CM (ref 0.6–1.1)
IVC DIAMETER: 1.6 CM
LEFT ATRIUM SIZE: 4.4 CM
LEFT INTERNAL DIMENSION IN SYSTOLE: 2.9 CM (ref 2.1–4)
LEFT VENTRICULAR INTERNAL DIMENSION IN DIASTOLE: 4.4 CM (ref 3.5–6)
LEFT VENTRICULAR MASS: 240.27 G

## 2022-03-11 DIAGNOSIS — Z71.89 COMPLEX CARE COORDINATION: ICD-10-CM

## 2022-07-15 ENCOUNTER — HOSPITAL ENCOUNTER (OUTPATIENT)
Dept: RADIOLOGY | Facility: HOSPITAL | Age: 67
Discharge: HOME | End: 2022-07-15
Payer: MEDICARE

## 2022-07-15 DIAGNOSIS — Z12.31 VISIT FOR SCREENING MAMMOGRAM: ICD-10-CM

## 2022-07-15 PROCEDURE — 77067 SCR MAMMO BI INCL CAD: CPT | Mod: TC

## 2022-08-23 ENCOUNTER — OFFICE VISIT (OUTPATIENT)
Dept: CARDIOLOGY | Facility: CLINIC | Age: 67
End: 2022-08-23
Payer: MEDICARE

## 2022-08-23 VITALS
SYSTOLIC BLOOD PRESSURE: 154 MMHG | HEIGHT: 62 IN | WEIGHT: 158.25 LBS | HEART RATE: 72 BPM | BODY MASS INDEX: 29.12 KG/M2 | RESPIRATION RATE: 14 BRPM | DIASTOLIC BLOOD PRESSURE: 70 MMHG

## 2022-08-23 DIAGNOSIS — I10 ESSENTIAL HYPERTENSION: Chronic | ICD-10-CM

## 2022-08-23 DIAGNOSIS — I25.10 CORONARY ARTERY DISEASE INVOLVING NATIVE CORONARY ARTERY OF NATIVE HEART WITHOUT ANGINA PECTORIS: Primary | ICD-10-CM

## 2022-08-23 DIAGNOSIS — E78.00 HYPERCHOLESTEREMIA: Chronic | ICD-10-CM

## 2022-08-23 DIAGNOSIS — R20.0 NUMBNESS IN FEET: ICD-10-CM

## 2022-08-23 DIAGNOSIS — Z79.899 ENCOUNTER FOR LONG-TERM (CURRENT) USE OF OTHER MEDICATIONS: ICD-10-CM

## 2022-08-23 PROCEDURE — 93005 ELECTROCARDIOGRAM TRACING: CPT | Mod: PBBFAC | Performed by: INTERNAL MEDICINE

## 2022-08-23 PROCEDURE — 93010 ELECTROCARDIOGRAM REPORT: CPT | Mod: S$PBB,,, | Performed by: INTERNAL MEDICINE

## 2022-08-23 PROCEDURE — 93010 EKG 12-LEAD: ICD-10-PCS | Mod: S$PBB,,, | Performed by: INTERNAL MEDICINE

## 2022-08-23 PROCEDURE — 99214 PR OFFICE/OUTPT VISIT, EST, LEVL IV, 30-39 MIN: ICD-10-PCS | Mod: S$PBB,,, | Performed by: INTERNAL MEDICINE

## 2022-08-23 PROCEDURE — 99215 OFFICE O/P EST HI 40 MIN: CPT | Mod: PBBFAC | Performed by: INTERNAL MEDICINE

## 2022-08-23 PROCEDURE — 99214 OFFICE O/P EST MOD 30 MIN: CPT | Mod: S$PBB,,, | Performed by: INTERNAL MEDICINE

## 2022-08-29 NOTE — PROGRESS NOTES
[facility] note        DATE OF SERVICE: 2022       PCP: Charles Romo DO      CHIEF COMPLAINT:   Chief Complaint   Patient presents with    Follow-up     6 Months. Denies any cardiac complaints        HISTORY OF PRESENT ILLNESS:  Melody Foster is a 67 y.o. female with a PMH of   Past Medical History:   Diagnosis Date    Anxiety     COPD (chronic obstructive pulmonary disease)     Coronary artery disease     Hyperlipidemia     Hypertension     Iron deficiency anemia     Myocardial infarction 2021    NSTEMI    Spinal stenosis     Vitamin D deficiency      who presents for   Chief Complaint   Patient presents with    Follow-up     6 Months. Denies any cardiac complaints          Review of Systems: Review of Systems   Respiratory:  Negative for shortness of breath.    Cardiovascular:  Negative for chest pain, palpitations and leg swelling.   Neurological:  Negative for loss of consciousness.      PAST MEDICAL HISTORY:  Past Medical History:   Diagnosis Date    Anxiety     COPD (chronic obstructive pulmonary disease)     Coronary artery disease     Hyperlipidemia     Hypertension     Iron deficiency anemia     Myocardial infarction 2021    NSTEMI    Spinal stenosis     Vitamin D deficiency        PAST SURGICAL HISTORY:  Past Surgical History:   Procedure Laterality Date     SECTION      COLONOSCOPY      CORONARY ANGIOPLASTY WITH STENT PLACEMENT Left 2011    LEFT HEART CATHETERIZATION Left 2021    Procedure: Left heart cath;  Surgeon: Denis Antonio MD;  Location: [facility] CATH LAB;  Service: Cardiology;  Laterality: Left;    TOTAL ABDOMINAL HYSTERECTOMY         SOCIAL HISTORY:  Social History     Socioeconomic History    Marital status:    Tobacco Use    Smoking status: Former     Packs/day: 0.50     Years: 30.00     Pack years: 15.00     Types: Cigarettes     Quit date: 2021     Years since quittin.3    Smokeless tobacco: Never   Substance and Sexual  Activity    Alcohol use: Never    Drug use: Never       FAMILY HISTORY:  Family History   Problem Relation Age of Onset    Anemia Mother     Crohn's disease Mother     Anxiety disorder Mother          ALLERGIES:  Review of patient's allergies indicates:   Allergen Reactions    Hydrocodone-acetaminophen Nausea And Vomiting        MEDICATIONS:    Current Outpatient Medications:     aspirin (ECOTRIN) 81 MG EC tablet, Take 81 mg by mouth once daily., Disp: , Rfl:     BRILINTA 90 mg tablet, TAKE 1 TABLET TWICE A DAY, Disp: 180 tablet, Rfl: 3    cholecalciferol, vitamin D3, (VITAMIN D3) 50 mcg (2,000 unit) Cap, Take 1 capsule by mouth once daily., Disp: , Rfl:     cyanocobalamin (VITAMIN B-12) 1000 MCG tablet, Take 1,000 mcg by mouth once daily., Disp: , Rfl:     DULoxetine (CYMBALTA) 60 MG capsule, Take 1 capsule (60 mg total) by mouth nightly. (Patient taking differently: Take 60 mg by mouth every evening. Take one (1) tablet), Disp: 90 capsule, Rfl: 3    evolocumab (REPATHA PUSHTRONEX) 420 mg/3.5 mL Injt, Inject 3.5 mLs (420 mg total) into the skin every 30 days., Disp: 3.5 mL, Rfl: 11    evolocumab (REPATHA SURECLICK) 140 mg/mL PnIj, Inject 1 mL (140 mg total) into the skin every 14 (fourteen) days., Disp: 6 mL, Rfl: 3    ezetimibe (ZETIA) 10 mg tablet, Take 1 tablet (10 mg total) by mouth once daily., Disp: 90 tablet, Rfl: 3    ferrous sulfate (FEOSOL) 325 mg (65 mg iron) Tab tablet, Take 325 mg by mouth once daily., Disp: , Rfl:     losartan-hydrochlorothiazide 100-12.5 mg (HYZAAR) 100-12.5 mg Tab, TAKE 1 TABLET DAILY, Disp: 90 tablet, Rfl: 3    metoprolol succinate (TOPROL-XL) 50 MG 24 hr tablet, Take 1 tablet (50 mg total) by mouth once daily., Disp: 90 tablet, Rfl: 3    omega-3 acid ethyl esters (LOVAZA) 1 gram capsule, , Disp: , Rfl:     pantoprazole (PROTONIX) 40 MG tablet, Take 1 tablet (40 mg total) by mouth once daily., Disp: 90 tablet, Rfl: 3    trimethobenzamide (TIGAN) 300 mg capsule, Take 300 mg by mouth  "every 8 (eight) hours as needed., Disp: , Rfl:      PHYSICAL EXAM:  BP (!) 154/70 (BP Location: Left arm, Patient Position: Sitting)   Pulse 72   Resp 14   Ht 5' 2" (1.575 m)   Wt 71.8 kg (158 lb 4 oz)   BMI 28.94 kg/m²   Wt Readings from Last 3 Encounters:   08/23/22 71.8 kg (158 lb 4 oz)   01/26/22 73.4 kg (161 lb 12 oz)   08/09/21 63.5 kg (140 lb)      Body mass index is 28.94 kg/m².    Physical Exam  Vitals reviewed.   Constitutional:       Appearance: Normal appearance.   HENT:      Head: Normocephalic and atraumatic.   Neck:      Vascular: Carotid bruit present. No JVD.      Comments: Bilateral, R>L  Cardiovascular:      Rate and Rhythm: Normal rate and regular rhythm.      Pulses: Normal pulses.           Radial pulses are 2+ on the right side and 2+ on the left side.        Dorsalis pedis pulses are 2+ on the right side.      Heart sounds: Murmur heard.   Systolic murmur is present with a grade of 2/6.      Comments: II/ VI MICHAEL  Pulmonary:      Effort: Pulmonary effort is normal.      Breath sounds: Normal breath sounds.   Musculoskeletal:      Right lower leg: No edema.      Left lower leg: No edema.   Skin:     General: Skin is warm and dry.   Neurological:      Mental Status: She is alert.       LABS REVIEWED:  Lab Results   Component Value Date    WBC 9.23 05/28/2021    RBC 3.84 (L) 05/28/2021    HGB 11.4 (L) 05/28/2021    HCT 35.4 (L) 05/28/2021    MCV 92.2 05/28/2021    MCH 29.7 05/28/2021    MCHC 32.2 05/28/2021    RDW 13.9 05/28/2021     05/28/2021    MPV 9.8 05/28/2021    NRBC 0.0 05/28/2021    INR 0.97 05/28/2021     Lab Results   Component Value Date     05/27/2021    K 3.7 05/27/2021     05/27/2021    CO2 33 (H) 05/27/2021    BUN 16 05/27/2021     Lab Results   Component Value Date    AST 14 (L) 05/27/2021    ALT 17 09/01/2021     Lab Results   Component Value Date     (H) 05/27/2021     Lab Results   Component Value Date    CHOL 217 (H) 09/01/2021    HDL 36 (L) " 09/01/2021    TRIG 258 (H) 09/01/2021    CHOLHDL 6.0 09/01/2021       CARDIAC STUDIES REVIEWED:EKG: normal sinus rhythm, nonspecific T wave abn, 73.         ASSESSMENT:  MARION's-- Left .83 (mild disease)  Right .86 (mild disease)    Active Problem List with Overview Notes    Diagnosis Date Noted    Numbness in feet 08/30/2022    Spinal stenosis 01/27/2022    Undiagnosed cardiac murmurs 01/27/2022    Gastroesophageal reflux disease 08/09/2021    Dyslipidemia 08/09/2021    Hypercholesteremia 07/01/2021    Carotid insufficiency 06/07/2021    NSTEMI (non-ST elevated myocardial infarction) 05/28/2021    COPD (chronic obstructive pulmonary disease) 05/28/2021    Bilateral carotid artery stenosis 05/28/2021    Essential hypertension 05/28/2021    Coronary artery disease involving native heart without angina pectoris 10/07/2019    Tobacco use disorder 10/07/2019    Carotid stenosis 02/14/2019     VISIT DIAGNOSIS:  Coronary artery disease involving native coronary artery of native heart without angina pectoris  -     EKG 12-lead; Future    Numbness in feet  -     Ambulatory referral/consult to Neurology; Future; Expected date: 11/23/2022  -     Ankle brachial index    Hypercholesteremia  -     Lipid Panel; Future; Expected date: 08/23/2022    Encounter for long-term (current) use of other medications  -     Lipid Panel; Future; Expected date: 08/23/2022  -     ALT (SGPT); Future; Expected date: 08/23/2022    Essential hypertension       PLAN:  Orders Placed This Encounter   Procedures    Lipid Panel     Standing Status:   Future     Standing Expiration Date:   10/22/2023    ALT (SGPT)     Standing Status:   Future     Standing Expiration Date:   10/22/2023    Ambulatory referral/consult to Neurology     Standing Status:   Future     Standing Expiration Date:   2/23/2024     Referral Priority:   Routine     Referral Type:   Consultation     Referral Reason:   Specialty Services Required     Referred to Provider:   Parish  MD Claribel     Requested Specialty:   Neurology     Number of Visits Requested:   1    Ankle brachial index    EKG 12-lead     Standing Status:   Future     Number of Occurrences:   1     Standing Expiration Date:   8/23/2023      RTC  6 months.

## 2022-08-30 PROBLEM — R20.0 NUMBNESS IN FEET: Status: ACTIVE | Noted: 2022-08-30

## 2022-10-09 DIAGNOSIS — Z71.89 COMPLEX CARE COORDINATION: ICD-10-CM

## 2022-10-11 ENCOUNTER — OFFICE VISIT (OUTPATIENT)
Dept: NEUROLOGY | Facility: CLINIC | Age: 67
End: 2022-10-11
Payer: MEDICARE

## 2022-10-11 VITALS
BODY MASS INDEX: 28.71 KG/M2 | OXYGEN SATURATION: 97 % | SYSTOLIC BLOOD PRESSURE: 130 MMHG | HEART RATE: 84 BPM | HEIGHT: 62 IN | DIASTOLIC BLOOD PRESSURE: 70 MMHG | WEIGHT: 156 LBS

## 2022-10-11 DIAGNOSIS — R20.0 NUMBNESS IN FEET: Primary | ICD-10-CM

## 2022-10-11 PROCEDURE — 99215 OFFICE O/P EST HI 40 MIN: CPT | Mod: PBBFAC | Performed by: PSYCHIATRY & NEUROLOGY

## 2022-10-11 PROCEDURE — 99204 PR OFFICE/OUTPT VISIT, NEW, LEVL IV, 45-59 MIN: ICD-10-PCS | Mod: S$PBB,,, | Performed by: PSYCHIATRY & NEUROLOGY

## 2022-10-11 PROCEDURE — 99204 OFFICE O/P NEW MOD 45 MIN: CPT | Mod: S$PBB,,, | Performed by: PSYCHIATRY & NEUROLOGY

## 2022-10-11 RX ORDER — PREGABALIN 75 MG/1
75 CAPSULE ORAL 2 TIMES DAILY
Qty: 180 CAPSULE | Refills: 3 | Status: SHIPPED | OUTPATIENT
Start: 2022-10-11 | End: 2023-09-14

## 2022-10-11 NOTE — PROGRESS NOTES
Subjective:       Patient ID: Melody Foster is a 67 y.o. female     Chief Complaint:    Chief Complaint   Patient presents with    Numbness     Both feet        Allergies:  Hydrocodone-acetaminophen    Current Medications:    Outpatient Encounter Medications as of 10/11/2022   Medication Sig Dispense Refill    aspirin (ECOTRIN) 81 MG EC tablet Take 81 mg by mouth once daily.      BRILINTA 90 mg tablet TAKE 1 TABLET TWICE A  tablet 3    cholecalciferol, vitamin D3, (VITAMIN D3) 50 mcg (2,000 unit) Cap Take 1 capsule by mouth once daily.      cyanocobalamin (VITAMIN B-12) 1000 MCG tablet Take 1,000 mcg by mouth once daily.      DULoxetine (CYMBALTA) 60 MG capsule Take 1 capsule (60 mg total) by mouth nightly. (Patient taking differently: Take 60 mg by mouth every evening. Take one (1) tablet) 90 capsule 3    evolocumab (REPATHA SURECLICK) 140 mg/mL PnIj Inject 1 mL (140 mg total) into the skin every 14 (fourteen) days. 6 mL 3    losartan-hydrochlorothiazide 100-12.5 mg (HYZAAR) 100-12.5 mg Tab TAKE 1 TABLET DAILY 90 tablet 3    omega-3 acid ethyl esters (LOVAZA) 1 gram capsule       pantoprazole (PROTONIX) 40 MG tablet Take 1 tablet (40 mg total) by mouth once daily. 90 tablet 3    trimethobenzamide (TIGAN) 300 mg capsule Take 300 mg by mouth every 8 (eight) hours as needed.      evolocumab (REPATHA PUSHTRONEX) 420 mg/3.5 mL Injt Inject 3.5 mLs (420 mg total) into the skin every 30 days. 3.5 mL 11    ezetimibe (ZETIA) 10 mg tablet Take 1 tablet (10 mg total) by mouth once daily. 90 tablet 3    ferrous sulfate (FEOSOL) 325 mg (65 mg iron) Tab tablet Take 325 mg by mouth once daily.      metoprolol succinate (TOPROL-XL) 50 MG 24 hr tablet Take 1 tablet (50 mg total) by mouth once daily. 90 tablet 3     No facility-administered encounter medications on file as of 10/11/2022.       History of Present Illness  68 yo WF w/ several yrs hx of paresthesias in both feet but prev NCV per Dr Carpenter  She is s/p lumbar  "laminectomy w/o any improvement in symptoms -now her fingers are getting numb   She has tried Moris in past but no help w/ symptoms   Symptoms occurring freq daily       Past Medical History:   Diagnosis Date    Anxiety     COPD (chronic obstructive pulmonary disease)     Coronary artery disease     Hyperlipidemia     Hypertension     Iron deficiency anemia     Myocardial infarction 2021    NSTEMI    Spinal stenosis     Vitamin D deficiency        Past Surgical History:   Procedure Laterality Date     SECTION      COLONOSCOPY      CORONARY ANGIOPLASTY WITH STENT PLACEMENT Left 2011    LEFT HEART CATHETERIZATION Left 2021    Procedure: Left heart cath;  Surgeon: Denis Antonio MD;  Location: [facility] CATH LAB;  Service: Cardiology;  Laterality: Left;    TOTAL ABDOMINAL HYSTERECTOMY         Social History  Ms. Foster  reports that she quit smoking about 17 months ago. Her smoking use included cigarettes. She has a 15.00 pack-year smoking history. She has never used smokeless tobacco. She reports that she does not drink alcohol and does not use drugs.    Family History  Ms.'s Foster family history includes Anemia in her mother; Anxiety disorder in her mother; Crohn's disease in her mother.    Review of Systems  Review of Systems   Neurological:  Positive for tingling and sensory change.   All other systems reviewed and are negative.   Objective:   /70 (BP Location: Left arm, Patient Position: Sitting, BP Method: Medium (Manual))   Pulse 84   Ht 5' 2" (1.575 m)   Wt 70.8 kg (156 lb)   SpO2 97%   BMI 28.53 kg/m²    NEUROLOGICAL EXAMINATION:     MENTAL STATUS   Oriented to person, place, and time.   Level of consciousness: alert  Knowledge: good.     CRANIAL NERVES   Cranial nerves II through XII intact.     MOTOR EXAM     Strength   Strength 5/5 throughout.     GAIT AND COORDINATION     Gait  Gait: normal     Physical Exam  Vitals reviewed.   Neurological:      Mental Status: She is " alert and oriented to person, place, and time. Mental status is at baseline.      Cranial Nerves: Cranial nerves 2-12 are intact.      Motor: Motor strength is normal.      Gait: Gait is intact.        Assessment:     Numbness in feet  -     Ambulatory referral/consult to Neurology       Primary Diagnosis and ICD10  No primary diagnosis found.    Plan:     Patient Instructions   Needs trial Lyrica 75 mg twice daily  Cont smoking cessation  Needs vit B12 level drawn   F/u 2 months     There are no discontinued medications.    Requested Prescriptions      No prescriptions requested or ordered in this encounter

## 2022-10-11 NOTE — PATIENT INSTRUCTIONS
Needs trial Lyrica 75 mg twice daily  Cont smoking cessation  Needs vit B12 level drawn   F/u 2 months

## 2023-01-24 ENCOUNTER — OFFICE VISIT (OUTPATIENT)
Dept: INTERNAL MEDICINE | Facility: CLINIC | Age: 68
End: 2023-01-24
Payer: MEDICARE

## 2023-01-24 VITALS
WEIGHT: 157 LBS | DIASTOLIC BLOOD PRESSURE: 65 MMHG | HEIGHT: 62 IN | SYSTOLIC BLOOD PRESSURE: 143 MMHG | TEMPERATURE: 99 F | BODY MASS INDEX: 28.89 KG/M2 | OXYGEN SATURATION: 98 % | HEART RATE: 74 BPM | RESPIRATION RATE: 16 BRPM

## 2023-01-24 DIAGNOSIS — K21.9 GASTROESOPHAGEAL REFLUX DISEASE, UNSPECIFIED WHETHER ESOPHAGITIS PRESENT: ICD-10-CM

## 2023-01-24 DIAGNOSIS — I25.10 CORONARY ARTERY DISEASE INVOLVING NATIVE CORONARY ARTERY OF NATIVE HEART WITHOUT ANGINA PECTORIS: ICD-10-CM

## 2023-01-24 DIAGNOSIS — E78.5 DYSLIPIDEMIA: ICD-10-CM

## 2023-01-24 DIAGNOSIS — J44.9 CHRONIC OBSTRUCTIVE PULMONARY DISEASE, UNSPECIFIED COPD TYPE: ICD-10-CM

## 2023-01-24 DIAGNOSIS — R20.0 NUMBNESS IN FEET: ICD-10-CM

## 2023-01-24 DIAGNOSIS — I65.23 BILATERAL CAROTID ARTERY STENOSIS: ICD-10-CM

## 2023-01-24 DIAGNOSIS — Z09 FOLLOW-UP EXAM: Primary | ICD-10-CM

## 2023-01-24 DIAGNOSIS — M48.00 SPINAL STENOSIS, UNSPECIFIED SPINAL REGION: Chronic | ICD-10-CM

## 2023-01-24 DIAGNOSIS — I10 ESSENTIAL HYPERTENSION: ICD-10-CM

## 2023-01-24 PROCEDURE — 99215 OFFICE O/P EST HI 40 MIN: CPT | Mod: PBBFAC | Performed by: INTERNAL MEDICINE

## 2023-01-24 PROCEDURE — 99214 PR OFFICE/OUTPT VISIT, EST, LEVL IV, 30-39 MIN: ICD-10-PCS | Mod: S$PBB,,, | Performed by: INTERNAL MEDICINE

## 2023-01-24 PROCEDURE — 99214 OFFICE O/P EST MOD 30 MIN: CPT | Mod: S$PBB,,, | Performed by: INTERNAL MEDICINE

## 2023-01-24 RX ORDER — ALBUTEROL SULFATE 90 UG/1
2 AEROSOL, METERED RESPIRATORY (INHALATION) EVERY 6 HOURS PRN
Qty: 18 G | Refills: 5 | Status: SHIPPED | OUTPATIENT
Start: 2023-01-24

## 2023-01-24 NOTE — PROGRESS NOTES
Subjective:       Patient ID: Melody Foster is a 67 y.o. female.    Chief Complaint: Follow-up (Discuss neuropathy and medications )    The patient is a 66-year-old white female the presents today to establish care.  She has a history of hypertension, dyslipidemia, GERD, tobacco dependence, and coronary artery disease.  Last stent was placed May 28th of this year.  She is currently on Brilinta and aspirin.  Currently no acute angina.  She follows with Dr. Cavazos.  Health maintenance issues are up-to-date.  Patient quit smoking on May 27th of this year.  She has received both doses of COVID vaccine.  Today she is resting comfortably in no distress.  She is afebrile and vital signs are stable.    1/24/23-the patient presents today for follow-up.  She denies any chest pain or shortness of breath.  She states since we recently saw her cardiologist.  She continues to abstain from smoking.  She does have some dyspnea on exertion.  She inquires about a possible inhaler.  EF on last echo was 60%.  She had her mammogram done in July of 2022.  She complains about neuropathy in bilateral lower extremities and also some numbness and tingling in bilateral hands.  She has been seen by Neurology and surgery in the past.  Her lower extremity issues or felt to be secondary to spinal stenosis in lumbar spine.  She had a surgical procedure March 31, 2022 but her symptoms have not resolved.  She had both Lyrica and gabapentin prescribed in the past but she is never taking them.  Otherwise she is resting comfortably in no distress.  She is afebrile and vital signs are stable.    Follow-up  Associated symptoms include numbness. Pertinent negatives include no abdominal pain, arthralgias, chest pain, chills, congestion, coughing, fatigue, fever, headaches, joint swelling, myalgias, nausea, neck pain, rash, sore throat or weakness.   Review of Systems   Constitutional:  Negative for appetite change, chills, fatigue and fever.   HENT:   Negative for nasal congestion, ear pain, hearing loss, sinus pressure/congestion and sore throat.    Eyes:  Negative for pain, redness and visual disturbance.   Respiratory:  Negative for apnea, cough, shortness of breath and wheezing.    Cardiovascular:  Negative for chest pain and palpitations.   Gastrointestinal:  Negative for abdominal pain, constipation, diarrhea and nausea.   Endocrine: Negative for cold intolerance, heat intolerance and polyuria.   Genitourinary:  Negative for dysuria and hematuria.   Musculoskeletal:  Negative for arthralgias, back pain, joint swelling, myalgias and neck pain.   Integumentary:  Negative for pallor, rash and wound.   Allergic/Immunologic: Negative for immunocompromised state.   Neurological:  Positive for numbness. Negative for tremors, seizures, weakness, headaches and memory loss.   Hematological:  Negative for adenopathy.   Psychiatric/Behavioral:  Negative for confusion, dysphoric mood and sleep disturbance. The patient is not nervous/anxious.        Objective:      Physical Exam  Vitals and nursing note reviewed.   Constitutional:       General: She is not in acute distress.     Appearance: Normal appearance. She is obese. She is not ill-appearing.   HENT:      Head: Normocephalic and atraumatic.      Right Ear: External ear normal.      Left Ear: External ear normal.      Nose: Nose normal.      Mouth/Throat:      Pharynx: Oropharynx is clear.   Eyes:      Extraocular Movements: Extraocular movements intact.      Conjunctiva/sclera: Conjunctivae normal.      Pupils: Pupils are equal, round, and reactive to light.   Neck:      Vascular: Carotid bruit present.   Cardiovascular:      Rate and Rhythm: Normal rate and regular rhythm.      Pulses: Normal pulses.      Heart sounds: Murmur heard.      Comments: 2-3/6 systolic murmur heard best at 2nd ICS on right with radiation to carotids  Pulmonary:      Effort: No respiratory distress.      Breath sounds: Normal breath  sounds. No wheezing or rales.   Abdominal:      General: Bowel sounds are normal. There is no distension.      Palpations: Abdomen is soft.   Musculoskeletal:         General: Normal range of motion.      Cervical back: Normal range of motion and neck supple.      Right lower leg: No edema.      Left lower leg: No edema.   Skin:     General: Skin is warm and dry.      Capillary Refill: Capillary refill takes less than 2 seconds.      Coloration: Skin is not pale.   Neurological:      General: No focal deficit present.      Mental Status: She is alert and oriented to person, place, and time.      Cranial Nerves: No cranial nerve deficit.      Motor: No weakness.      Gait: Gait normal.   Psychiatric:         Mood and Affect: Mood normal.         Judgment: Judgment normal.       Assessment:       1. Follow-up exam    2. Spinal stenosis, unspecified spinal region    3. Chronic obstructive pulmonary disease, unspecified COPD type    4. Bilateral carotid artery stenosis    5. Essential hypertension    6. Coronary artery disease involving native coronary artery of native heart without angina pectoris    7. Dyslipidemia    8. Gastroesophageal reflux disease, unspecified whether esophagitis present    9. Numbness in feet        Plan:       1. She presents today for follow-up. She is up-to-date on mammogram and colonoscopy.  We have ordered some lab work for today    2. Essential hypertension-blood pressure looks pretty good.  Patient is currently on losartan/hydrochlorothiazide 100/12.5, metoprolol 50 mg daily.    3. GERD-stable.  Continue with PPI    4. Coronary artery-disease-last stent placed on May 28th of 2021  She is on Brilinta and aspirin.  She follows with Cardiology.  Currently no acute issues.  Nothing to suggest angina.  She recently saw Cardiology    5. Dyslipidemia-patient currently on Zetia.    6. Tobacco dependence-patient quit smoking on May 27th 2021.  She is doing well with this.  I have encouraged her to  continue with cessation.  No smoking since then    7. Spinal stenosis-she had surgical repair March 31, 2022.  She still has symptoms of neuropathy in her feet.  She is now willing to try the gabapentin to see if that will help    8. Bilateral hand numbness/paresthesias-likely a component of carpal tunnel syndrome.  We have discussed trying wrist splints at bedtime

## 2023-03-28 ENCOUNTER — OFFICE VISIT (OUTPATIENT)
Dept: CARDIOLOGY | Facility: CLINIC | Age: 68
End: 2023-03-28
Payer: MEDICARE

## 2023-03-28 VITALS
BODY MASS INDEX: 29.15 KG/M2 | RESPIRATION RATE: 16 BRPM | HEIGHT: 62 IN | HEART RATE: 74 BPM | WEIGHT: 158.38 LBS | DIASTOLIC BLOOD PRESSURE: 56 MMHG | SYSTOLIC BLOOD PRESSURE: 114 MMHG

## 2023-03-28 DIAGNOSIS — E78.00 HYPERCHOLESTEREMIA: Chronic | ICD-10-CM

## 2023-03-28 DIAGNOSIS — I25.10 CORONARY ARTERY DISEASE INVOLVING NATIVE CORONARY ARTERY OF NATIVE HEART WITHOUT ANGINA PECTORIS: Primary | Chronic | ICD-10-CM

## 2023-03-28 DIAGNOSIS — I10 ESSENTIAL HYPERTENSION: Chronic | ICD-10-CM

## 2023-03-28 PROCEDURE — 99214 OFFICE O/P EST MOD 30 MIN: CPT | Mod: S$PBB,,, | Performed by: INTERNAL MEDICINE

## 2023-03-28 PROCEDURE — 99214 PR OFFICE/OUTPT VISIT, EST, LEVL IV, 30-39 MIN: ICD-10-PCS | Mod: S$PBB,,, | Performed by: INTERNAL MEDICINE

## 2023-03-28 PROCEDURE — 93005 ELECTROCARDIOGRAM TRACING: CPT | Mod: PBBFAC | Performed by: INTERNAL MEDICINE

## 2023-03-28 PROCEDURE — 93010 EKG 12-LEAD: ICD-10-PCS | Mod: S$PBB,,, | Performed by: INTERNAL MEDICINE

## 2023-03-28 PROCEDURE — 93010 ELECTROCARDIOGRAM REPORT: CPT | Mod: S$PBB,,, | Performed by: INTERNAL MEDICINE

## 2023-03-28 PROCEDURE — 99214 OFFICE O/P EST MOD 30 MIN: CPT | Mod: PBBFAC | Performed by: INTERNAL MEDICINE

## 2023-03-30 RX ORDER — EVOLOCUMAB 140 MG/ML
140 INJECTION, SOLUTION SUBCUTANEOUS
Qty: 6 ML | Refills: 3 | Status: SHIPPED | OUTPATIENT
Start: 2023-03-30 | End: 2024-01-10 | Stop reason: SDUPTHER

## 2023-03-30 RX ORDER — EZETIMIBE 10 MG/1
10 TABLET ORAL DAILY
Qty: 90 TABLET | Refills: 3 | Status: SHIPPED | OUTPATIENT
Start: 2023-03-30 | End: 2024-01-26

## 2023-03-30 RX ORDER — METOPROLOL SUCCINATE 50 MG/1
50 TABLET, EXTENDED RELEASE ORAL DAILY
Qty: 90 TABLET | Refills: 3 | Status: SHIPPED | OUTPATIENT
Start: 2023-03-30 | End: 2024-03-29

## 2023-03-30 RX ORDER — LOSARTAN POTASSIUM AND HYDROCHLOROTHIAZIDE 12.5; 1 MG/1; MG/1
1 TABLET ORAL DAILY
Qty: 90 TABLET | Refills: 3 | Status: SHIPPED | OUTPATIENT
Start: 2023-03-30 | End: 2024-03-29

## 2023-03-31 NOTE — PROGRESS NOTES
[facility] note        DATE OF SERVICE: 2023       PCP: Charles Romo DO      CHIEF COMPLAINT:   Chief Complaint   Patient presents with    Follow-up     6 Month    Shortness of Breath     SOB with exertion        HISTORY OF PRESENT ILLNESS:  Melody Foster is a 68 y.o. female with a PMH of   Past Medical History:   Diagnosis Date    Anxiety     COPD (chronic obstructive pulmonary disease)     Coronary artery disease     Hyperlipidemia     Hypertension     Iron deficiency anemia     Myocardial infarction 2021    NSTEMI    Spinal stenosis     Vitamin D deficiency      who presents for   Chief Complaint   Patient presents with    Follow-up     6 Month    Shortness of Breath     SOB with exertion          Review of Systems: Review of Systems   Respiratory:  Positive for shortness of breath.    Cardiovascular:  Negative for chest pain, palpitations and leg swelling.   Neurological:  Negative for loss of consciousness.      PAST MEDICAL HISTORY:  Past Medical History:   Diagnosis Date    Anxiety     COPD (chronic obstructive pulmonary disease)     Coronary artery disease     Hyperlipidemia     Hypertension     Iron deficiency anemia     Myocardial infarction 2021    NSTEMI    Spinal stenosis     Vitamin D deficiency        PAST SURGICAL HISTORY:  Past Surgical History:   Procedure Laterality Date     SECTION      COLONOSCOPY      CORONARY ANGIOPLASTY WITH STENT PLACEMENT Left 2011    LEFT HEART CATHETERIZATION Left 2021    Procedure: Left heart cath;  Surgeon: Denis Antonio MD;  Location: [facility] CATH LAB;  Service: Cardiology;  Laterality: Left;    TOTAL ABDOMINAL HYSTERECTOMY         SOCIAL HISTORY:  Social History     Socioeconomic History    Marital status:    Tobacco Use    Smoking status: Former     Packs/day: 0.50     Years: 30.00     Pack years: 15.00     Types: Cigarettes     Quit date: 2021     Years since quittin.9    Smokeless tobacco: Never    Substance and Sexual Activity    Alcohol use: Never    Drug use: Never       FAMILY HISTORY:  Family History   Problem Relation Age of Onset    Anemia Mother     Crohn's disease Mother     Anxiety disorder Mother          ALLERGIES:  Review of patient's allergies indicates:   Allergen Reactions    Hydrocodone-acetaminophen Nausea And Vomiting        MEDICATIONS:    Current Outpatient Medications:     albuterol (VENTOLIN HFA) 90 mcg/actuation inhaler, Inhale 2 puffs into the lungs every 6 (six) hours as needed for Wheezing. Rescue, Disp: 18 g, Rfl: 5    aspirin (ECOTRIN) 81 MG EC tablet, Take 81 mg by mouth once daily., Disp: , Rfl:     cholecalciferol, vitamin D3, (VITAMIN D3) 50 mcg (2,000 unit) Cap, Take 1 capsule by mouth once daily., Disp: , Rfl:     cyanocobalamin (VITAMIN B-12) 1000 MCG tablet, Take 1,000 mcg by mouth once daily., Disp: , Rfl:     DULoxetine (CYMBALTA) 60 MG capsule, Take 1 capsule (60 mg total) by mouth nightly. (Patient taking differently: Take 60 mg by mouth every evening. Take one (1) tablet), Disp: 90 capsule, Rfl: 3    evolocumab (REPATHA SURECLICK) 140 mg/mL PnIj, Inject 1 mL (140 mg total) into the skin every 14 (fourteen) days., Disp: 6 mL, Rfl: 3    ezetimibe (ZETIA) 10 mg tablet, Take 1 tablet (10 mg total) by mouth once daily., Disp: 90 tablet, Rfl: 3    ferrous sulfate (FEOSOL) 325 mg (65 mg iron) Tab tablet, Take 325 mg by mouth once daily., Disp: , Rfl:     losartan-hydrochlorothiazide 100-12.5 mg (HYZAAR) 100-12.5 mg Tab, Take 1 tablet by mouth once daily., Disp: 90 tablet, Rfl: 3    metoprolol succinate (TOPROL-XL) 50 MG 24 hr tablet, Take 1 tablet (50 mg total) by mouth once daily., Disp: 90 tablet, Rfl: 3    omega-3 acid ethyl esters (LOVAZA) 1 gram capsule, , Disp: , Rfl:     pantoprazole (PROTONIX) 40 MG tablet, TAKE 1 TABLET DAILY, Disp: 90 tablet, Rfl: 3    pregabalin (LYRICA) 75 MG capsule, Take 1 capsule (75 mg total) by mouth 2 (two) times daily., Disp: 180 capsule,  "Rfl: 3    ticagrelor (BRILINTA) 90 mg tablet, Take 1 tablet (90 mg total) by mouth 2 (two) times daily., Disp: 180 tablet, Rfl: 3    trimethobenzamide (TIGAN) 300 mg capsule, Take 300 mg by mouth every 8 (eight) hours as needed., Disp: , Rfl:    Medications reconciled by pt's list.     PHYSICAL EXAM:  BP (!) 114/56 (BP Location: Left arm, Patient Position: Sitting)   Pulse 74   Resp 16   Ht 5' 2" (1.575 m)   Wt 71.8 kg (158 lb 6.4 oz)   BMI 28.97 kg/m²   Wt Readings from Last 3 Encounters:   03/28/23 71.8 kg (158 lb 6.4 oz)   01/24/23 71.2 kg (157 lb)   10/11/22 70.8 kg (156 lb)      Body mass index is 28.97 kg/m².    Physical Exam  Vitals reviewed.   Constitutional:       Appearance: Normal appearance.   HENT:      Head: Normocephalic and atraumatic.   Neck:      Vascular: No carotid bruit or JVD.      Comments: Bilateral, R>L  Cardiovascular:      Rate and Rhythm: Normal rate and regular rhythm.      Pulses: Normal pulses.           Radial pulses are 2+ on the right side and 2+ on the left side.        Dorsalis pedis pulses are 2+ on the right side and 2+ on the left side.      Heart sounds: Murmur heard.   Systolic murmur is present with a grade of 2/6.      Comments: II/ VI MICHAEL RUSB  Pulmonary:      Effort: Pulmonary effort is normal.      Breath sounds: Normal breath sounds.   Musculoskeletal:      Right lower leg: No edema.      Left lower leg: No edema.   Skin:     General: Skin is warm and dry.   Neurological:      Mental Status: She is alert and oriented to person, place, and time.       LABS REVIEWED:  Lab Results   Component Value Date    WBC 8.26 01/24/2023    RBC 3.84 (L) 01/24/2023    HGB 11.1 (L) 01/24/2023    HCT 34.5 (L) 01/24/2023    MCV 89.8 01/24/2023    MCH 28.9 01/24/2023    MCHC 32.2 01/24/2023    RDW 12.6 01/24/2023     01/24/2023    MPV 9.6 01/24/2023    NRBC 0.0 01/24/2023    INR 0.97 05/28/2021     Lab Results   Component Value Date     01/24/2023    K 4.4 01/24/2023    "  01/24/2023    CO2 32 01/24/2023    BUN 18 01/24/2023     Lab Results   Component Value Date    AST 19 01/24/2023    ALT 28 01/24/2023     Lab Results   Component Value Date     (H) 01/24/2023     Lab Results   Component Value Date    CHOL 216 (H) 01/24/2023    HDL 37 (L) 01/24/2023    TRIG 278 (H) 01/24/2023    CHOLHDL 5.8 01/24/2023       CARDIAC STUDIES REVIEWED:  EKG:     3/28/23--NSR, T wave abn, 74 bpm  8/23/22--normal sinus rhythm, nonspecific T wave abn, 73.     Echo    1/28/22--Interpretation Summary  · The left ventricle is normal in size with mild concentric hypertrophy and  · The estimated ejection fraction is 60%.  · Normal right ventricular size.  · Mild mitral regurgitation.  · Mild tricuspid regurgitation.  · Mild left atrial enlargement.      Cardiac catheterization    5/27/21--Conclusion  · The left ventricular systolic function was normal.  · The pre-procedure left ventricular end diastolic pressure was 20.  · The ejection fraction was calculated to be 65%.  · The RPDA lesion was 70% stenosed.  · The Prox LAD-2 lesion was 95% stenosed.  · The Prox LAD-1 lesion was 95% stenosed with 10% stenosis post-intervention.  · A stent was successfully placed at 12 ORTIZ for 10 sec.  · The estimated blood loss was none.  · There was two vessel coronary artery disease.    The procedure log was documented by Documenter: Lynne Narvaez RN and verified by Denis Antonio MD.    Date: 5/28/2021  Time: 6:03 PM    3/7/11--Severe 2 vessel CAD.  Acute inferior STEMI.  Successful revascularization of the pRCA.  Successful revascularization of the proximal and mid LAD.  No MR.       ASSESSMENT: neg    Active Problem List with Overview Notes    Diagnosis Date Noted    Numbness in feet 08/30/2022    Spinal stenosis 01/27/2022    Undiagnosed cardiac murmurs 01/27/2022    Gastroesophageal reflux disease 08/09/2021    Dyslipidemia 08/09/2021    Hypercholesteremia 07/01/2021    Carotid  insufficiency 06/07/2021    NSTEMI (non-ST elevated myocardial infarction) 05/28/2021    COPD (chronic obstructive pulmonary disease) 05/28/2021    Bilateral carotid artery stenosis 05/28/2021    Essential hypertension 05/28/2021    Coronary artery disease involving native heart without angina pectoris 10/07/2019    Tobacco use disorder 10/07/2019    Carotid stenosis 02/14/2019     VISIT DIAGNOSIS:  Coronary artery disease involving native coronary artery of native heart without angina pectoris  -     EKG 12-lead; Future    Essential hypertension    Hypercholesteremia    Other orders  -     ticagrelor (BRILINTA) 90 mg tablet; Take 1 tablet (90 mg total) by mouth 2 (two) times daily.  Dispense: 180 tablet; Refill: 3  -     ezetimibe (ZETIA) 10 mg tablet; Take 1 tablet (10 mg total) by mouth once daily.  Dispense: 90 tablet; Refill: 3  -     losartan-hydrochlorothiazide 100-12.5 mg (HYZAAR) 100-12.5 mg Tab; Take 1 tablet by mouth once daily.  Dispense: 90 tablet; Refill: 3  -     metoprolol succinate (TOPROL-XL) 50 MG 24 hr tablet; Take 1 tablet (50 mg total) by mouth once daily.  Dispense: 90 tablet; Refill: 3  -     evolocumab (REPATHA SURECLICK) 140 mg/mL PnIj; Inject 1 mL (140 mg total) into the skin every 14 (fourteen) days.  Dispense: 6 mL; Refill: 3         PLAN: refill medications, restart Repatha  Orders Placed This Encounter   Procedures    EKG 12-lead     Standing Status:   Future     Number of Occurrences:   1     Standing Expiration Date:   3/28/2024      RTC  6 months.

## 2023-05-09 DIAGNOSIS — Z71.89 COMPLEX CARE COORDINATION: ICD-10-CM

## 2023-06-06 ENCOUNTER — TELEPHONE (OUTPATIENT)
Dept: INTERNAL MEDICINE | Facility: CLINIC | Age: 68
End: 2023-06-06
Payer: MEDICARE

## 2023-06-06 NOTE — TELEPHONE ENCOUNTER
----- Message from Lisandra Russ sent at 6/6/2023  3:04 PM CDT -----  Patient needs her gabapentin refilled, but Dr. Romo didn't give her the medication.      Patient would like someone to give her a call back ASAP     [phone]

## 2023-06-06 NOTE — TELEPHONE ENCOUNTER
Spoke with pt, she stated that she received gabapentin from [facility] and was informed that the last time she was here that  could prescribe it for her if needed. She stated she had not took the medication until after she left the appointment from here and noticed improvements. Will ask Dr. Romo about sending in a prescription.

## 2023-06-07 ENCOUNTER — TELEPHONE (OUTPATIENT)
Dept: INTERNAL MEDICINE | Facility: CLINIC | Age: 68
End: 2023-06-07
Payer: MEDICARE

## 2023-06-07 RX ORDER — GABAPENTIN 300 MG/1
300 CAPSULE ORAL 2 TIMES DAILY
Qty: 180 CAPSULE | Refills: 3 | Status: SHIPPED | OUTPATIENT
Start: 2023-06-07 | End: 2024-06-06

## 2023-06-07 NOTE — TELEPHONE ENCOUNTER
Called to let pt know that medication has been sent in and that we will get referral sent. No answer. I left voicemail letting pt know this

## 2023-06-07 NOTE — TELEPHONE ENCOUNTER
Pt is asking for refill of gabapentin that emilie lopez wrote. Pt is also asking for referral to neuro in [place] for neuropathy as she was not pleased with dr here.

## 2023-06-13 DIAGNOSIS — G62.9 NEUROPATHY: Primary | ICD-10-CM

## 2023-07-18 ENCOUNTER — TELEPHONE (OUTPATIENT)
Dept: INTERNAL MEDICINE | Facility: CLINIC | Age: 68
End: 2023-07-18
Payer: MEDICARE

## 2023-07-18 NOTE — TELEPHONE ENCOUNTER
----- Message from Lisandra Russ sent at 7/18/2023  1:26 PM CDT -----  Patient returning phone back patient call back [phone]

## 2023-07-21 ENCOUNTER — HOSPITAL ENCOUNTER (OUTPATIENT)
Dept: RADIOLOGY | Facility: HOSPITAL | Age: 68
Discharge: HOME | End: 2023-07-21
Attending: INTERNAL MEDICINE
Payer: MEDICARE

## 2023-07-21 VITALS — HEIGHT: 62 IN | BODY MASS INDEX: 26.68 KG/M2 | WEIGHT: 145 LBS

## 2023-07-21 DIAGNOSIS — Z12.31 OTHER SCREENING MAMMOGRAM: ICD-10-CM

## 2023-07-21 PROCEDURE — 77067 SCR MAMMO BI INCL CAD: CPT | Mod: TC

## 2023-09-14 ENCOUNTER — OFFICE VISIT (OUTPATIENT)
Dept: INTERNAL MEDICINE | Facility: CLINIC | Age: 68
End: 2023-09-14
Payer: MEDICARE

## 2023-09-14 VITALS
BODY MASS INDEX: 29.63 KG/M2 | WEIGHT: 161 LBS | HEART RATE: 74 BPM | HEIGHT: 62 IN | RESPIRATION RATE: 18 BRPM | SYSTOLIC BLOOD PRESSURE: 120 MMHG | DIASTOLIC BLOOD PRESSURE: 54 MMHG | TEMPERATURE: 97 F | OXYGEN SATURATION: 94 %

## 2023-09-14 DIAGNOSIS — I65.23 BILATERAL CAROTID ARTERY STENOSIS: ICD-10-CM

## 2023-09-14 DIAGNOSIS — R13.10 DYSPHAGIA, UNSPECIFIED TYPE: ICD-10-CM

## 2023-09-14 DIAGNOSIS — Z13.820 OSTEOPOROSIS SCREENING: ICD-10-CM

## 2023-09-14 DIAGNOSIS — K21.9 GASTROESOPHAGEAL REFLUX DISEASE, UNSPECIFIED WHETHER ESOPHAGITIS PRESENT: ICD-10-CM

## 2023-09-14 DIAGNOSIS — J44.9 CHRONIC OBSTRUCTIVE PULMONARY DISEASE, UNSPECIFIED COPD TYPE: ICD-10-CM

## 2023-09-14 DIAGNOSIS — M48.00 SPINAL STENOSIS, UNSPECIFIED SPINAL REGION: Chronic | ICD-10-CM

## 2023-09-14 DIAGNOSIS — E78.5 DYSLIPIDEMIA: ICD-10-CM

## 2023-09-14 DIAGNOSIS — Z09 FOLLOW-UP EXAM: Primary | ICD-10-CM

## 2023-09-14 DIAGNOSIS — I10 ESSENTIAL HYPERTENSION: ICD-10-CM

## 2023-09-14 DIAGNOSIS — Z78.0 ASYMPTOMATIC MENOPAUSAL STATE: ICD-10-CM

## 2023-09-14 DIAGNOSIS — F17.200 TOBACCO USE DISORDER: ICD-10-CM

## 2023-09-14 DIAGNOSIS — I25.10 CORONARY ARTERY DISEASE INVOLVING NATIVE CORONARY ARTERY OF NATIVE HEART WITHOUT ANGINA PECTORIS: ICD-10-CM

## 2023-09-14 PROCEDURE — 99215 PR OFFICE/OUTPT VISIT, EST, LEVL V, 40-54 MIN: ICD-10-PCS | Mod: S$PBB,,, | Performed by: INTERNAL MEDICINE

## 2023-09-14 PROCEDURE — 99215 OFFICE O/P EST HI 40 MIN: CPT | Mod: S$PBB,,, | Performed by: INTERNAL MEDICINE

## 2023-09-14 PROCEDURE — 99215 OFFICE O/P EST HI 40 MIN: CPT | Mod: PBBFAC | Performed by: INTERNAL MEDICINE

## 2023-09-14 RX ORDER — MAGNESIUM 30 MG
1 TABLET ORAL DAILY
Qty: 90 TABLET | Refills: 1 | Status: SHIPPED | OUTPATIENT
Start: 2023-09-14 | End: 2024-01-26

## 2023-09-14 NOTE — PROGRESS NOTES
Subjective:       Patient ID: Melody Foster is a 68 y.o. female.    Chief Complaint: Follow-up (States her neuropathy is still giving her issues. She did go see a NP in [place] in August. She is planning to have a nerve conduction study completed. /Recently had lab work done at [facility], it is linked in chart. Would like  to review.)    The patient is a 66-year-old white female the presents today to establish care.  She has a history of hypertension, dyslipidemia, GERD, tobacco dependence, and coronary artery disease.  Last stent was placed May 28th of this year.  She is currently on Brilinta and aspirin.  Currently no acute angina.  She follows with Dr. Cavazos.  Health maintenance issues are up-to-date.  Patient quit smoking on May 27th of this year.  She has received both doses of COVID vaccine.  Today she is resting comfortably in no distress.  She is afebrile and vital signs are stable.    1/24/23-the patient presents today for follow-up.  She denies any chest pain or shortness of breath.  She states since we recently saw her cardiologist.  She continues to abstain from smoking.  She does have some dyspnea on exertion.  She inquires about a possible inhaler.  EF on last echo was 60%.  She had her mammogram done in July of 2022.  She complains about neuropathy in bilateral lower extremities and also some numbness and tingling in bilateral hands.  She has been seen by Neurology and surgery in the past.  Her lower extremity issues or felt to be secondary to spinal stenosis in lumbar spine.  She had a surgical procedure March 31, 2022 but her symptoms have not resolved.  She had both Lyrica and gabapentin prescribed in the past but she is never taking them.  Otherwise she is resting comfortably in no distress.  She is afebrile and vital signs are stable.    9/14/23-the patient presents today for follow-up.  In August she was seen by nurse practitioner to Neurology office for this neuropathy.  They checked  a bunch of lab work and she has results with her.  We have reviewed this lab work.  She would markedly elevated triglycerides but this was a nonfasting study.  Only other significant abnormality was her A1c was up a little bit at 6.1%.  Other than the neuropathy she denies any complaints.  Her breathing is better and she states that she is hardly had to use the albuterol inhaler.  She denies any chest pain or shortness of breath.  She is a follow-up appointment with Cardiology in October.  She states that her pharmacy discontinued sending her the Repatha.  She is still on Zetia.  We do need to set her up for a DEXA scan.  Colonoscopies up-to-date.  She is resting comfortably today in no distress.  She is afebrile and vital signs are stable.    Follow-up  Associated symptoms include numbness. Pertinent negatives include no abdominal pain, arthralgias, chest pain, chills, congestion, coughing, fatigue, fever, headaches, joint swelling, myalgias, nausea, neck pain, rash, sore throat or weakness.     Review of Systems   Constitutional:  Negative for appetite change, chills, fatigue and fever.   HENT:  Negative for nasal congestion, ear pain, hearing loss, sinus pressure/congestion and sore throat.    Eyes:  Negative for pain, redness and visual disturbance.   Respiratory:  Negative for apnea, cough, shortness of breath and wheezing.    Cardiovascular:  Negative for chest pain and palpitations.   Gastrointestinal:  Negative for abdominal pain, constipation, diarrhea and nausea.   Endocrine: Negative for cold intolerance, heat intolerance and polyuria.   Genitourinary:  Negative for dysuria and hematuria.   Musculoskeletal:  Negative for arthralgias, back pain, joint swelling, myalgias and neck pain.   Integumentary:  Negative for pallor, rash and wound.   Allergic/Immunologic: Negative for immunocompromised state.   Neurological:  Positive for numbness. Negative for tremors, seizures, weakness, headaches and memory loss.    Hematological:  Negative for adenopathy.   Psychiatric/Behavioral:  Negative for confusion, dysphoric mood and sleep disturbance. The patient is not nervous/anxious.          Objective:      Physical Exam  Vitals and nursing note reviewed.   Constitutional:       General: She is not in acute distress.     Appearance: Normal appearance. She is obese. She is not ill-appearing.   HENT:      Head: Normocephalic and atraumatic.      Right Ear: External ear normal.      Left Ear: External ear normal.      Nose: Nose normal.      Mouth/Throat:      Pharynx: Oropharynx is clear.   Eyes:      Extraocular Movements: Extraocular movements intact.      Conjunctiva/sclera: Conjunctivae normal.      Pupils: Pupils are equal, round, and reactive to light.   Neck:      Vascular: Carotid bruit present.   Cardiovascular:      Rate and Rhythm: Normal rate and regular rhythm.      Pulses: Normal pulses.      Heart sounds: Murmur heard.      Comments: 2-3/6 systolic murmur heard best at 2nd ICS on right with radiation to carotids  Pulmonary:      Effort: No respiratory distress.      Breath sounds: Normal breath sounds. No wheezing or rales.   Abdominal:      General: Bowel sounds are normal. There is no distension.      Palpations: Abdomen is soft.   Musculoskeletal:         General: Normal range of motion.      Cervical back: Normal range of motion and neck supple.      Right lower leg: No edema.      Left lower leg: No edema.   Skin:     General: Skin is warm and dry.      Capillary Refill: Capillary refill takes less than 2 seconds.      Coloration: Skin is not pale.   Neurological:      General: No focal deficit present.      Mental Status: She is alert and oriented to person, place, and time.      Cranial Nerves: No cranial nerve deficit.      Motor: No weakness.      Gait: Gait normal.   Psychiatric:         Mood and Affect: Mood normal.         Judgment: Judgment normal.         Assessment:       1. Follow-up exam    2. Spinal  stenosis, unspecified spinal region    3. Chronic obstructive pulmonary disease, unspecified COPD type    4. Bilateral carotid artery stenosis    5. Essential hypertension    6. Coronary artery disease involving native coronary artery of native heart without angina pectoris    7. Dyslipidemia    8. Gastroesophageal reflux disease, unspecified whether esophagitis present    9. Tobacco use disorder    10. Osteoporosis screening    11. Asymptomatic menopausal state    12. Dysphagia, unspecified type        Plan:       1. She presents today for follow-up. She is up-to-date on mammogram and colonoscopy.  I reviewed her lab work that she had done at an outside facility on August 21st.  Magnesium a little low at 1.6.  CRP a little high at 0.97.  Creatinine 1.3, A1c 6.1%.  H&H 10.2 and 32.5 with a normal MCV.  TSH within normal limits.  Triglycerides were 514 and total cholesterol was 218.  This was a nonfasting study.    2. Essential hypertension-blood pressure looks good.  It is 120/54. Patient is currently on losartan/hydrochlorothiazide 100/12.5, metoprolol 50 mg daily.    3. GERD-stable.  Continue with PPI    4. Coronary artery-disease-last stent placed on May 28th of 2021  She is on Brilinta and aspirin.  She follows with Cardiology.  Currently no acute issues.  Nothing to suggest angina.  She has an appointment with Cardiology in October 5. Dyslipidemia-patient currently on Zetia.  She is been unable to tolerate any statin.  She was getting Repatha but her pharmacy recently quit sending this to her.  She had a lipid panel done in outside facility but it was nonfasting.  Her triglycerides were 514.  I am going to repeat a lipid panel today.  The Repatha was set up through Cardiology and I have encouraged her to check with them regarding that medication.      6. Tobacco dependence-patient quit smoking on May 27th 2021.  She is doing well with this.  I have encouraged her to continue with cessation.  No smoking since  then    7. Spinal stenosis-she had surgical repair March 31, 2022.  She still has symptoms of neuropathy in her feet.  She is now willing to try the gabapentin to see if that will help  9/14/23-Neurontin not really helping much.  Although it does help a little bit.  We have discussed doubling up on her dose at bedtime to see if that will help.  She saw provider in [place] and she has a nerve conduction test upcoming.    8. Bilateral hand numbness/paresthesias-likely a component of carpal tunnel syndrome.  We have discussed trying wrist splints at bedtime--seems to be doing better    9. Pre diabetes-A1c 6.1%.  We are going to try diet and exercise over the next 4 months to see how she will do.  We will repeat an A1c at next visit    Return to care in 4 months

## 2023-09-25 ENCOUNTER — HOSPITAL ENCOUNTER (OUTPATIENT)
Dept: RADIOLOGY | Facility: HOSPITAL | Age: 68
Discharge: HOME | End: 2023-09-25
Attending: INTERNAL MEDICINE
Payer: MEDICARE

## 2023-09-25 DIAGNOSIS — Z78.0 ASYMPTOMATIC MENOPAUSAL STATE: ICD-10-CM

## 2023-09-25 DIAGNOSIS — Z13.820 OSTEOPOROSIS SCREENING: ICD-10-CM

## 2023-09-25 PROCEDURE — 77080 DXA BONE DENSITY AXIAL: CPT | Mod: 26,,, | Performed by: RADIOLOGY

## 2023-09-25 PROCEDURE — 77080 DXA BONE DENSITY AXIAL: CPT | Mod: TC

## 2023-09-25 PROCEDURE — 77080 DXA BONE DENSITY AXIAL SKELETON 1 OR MORE SITES: ICD-10-PCS | Mod: 26,,, | Performed by: RADIOLOGY

## 2023-09-28 ENCOUNTER — TELEPHONE (OUTPATIENT)
Dept: INTERNAL MEDICINE | Facility: CLINIC | Age: 68
End: 2023-09-28
Payer: MEDICARE

## 2023-09-28 DIAGNOSIS — E78.5 DYSLIPIDEMIA: Primary | ICD-10-CM

## 2023-09-28 NOTE — TELEPHONE ENCOUNTER
----- Message from Salena Perez sent at 9/28/2023  2:58 PM CDT -----  Patient called to get lab work results. Please give her a call back.

## 2023-09-28 NOTE — TELEPHONE ENCOUNTER
Spoke to pt and let her know that since she was not fasting. So at this time Dr. Patiño does not want to make any changes at this moment. Pt was not fasting that day. Dr patiño wants to repeat lab in about a month

## 2023-12-09 DIAGNOSIS — Z71.89 COMPLEX CARE COORDINATION: ICD-10-CM

## 2024-01-05 ENCOUNTER — TELEPHONE (OUTPATIENT)
Dept: INTERNAL MEDICINE | Facility: CLINIC | Age: 69
End: 2024-01-05
Payer: MEDICARE

## 2024-01-10 ENCOUNTER — OFFICE VISIT (OUTPATIENT)
Dept: CARDIOLOGY | Facility: CLINIC | Age: 69
End: 2024-01-10
Payer: MEDICARE

## 2024-01-10 VITALS
SYSTOLIC BLOOD PRESSURE: 124 MMHG | WEIGHT: 155.38 LBS | HEART RATE: 75 BPM | BODY MASS INDEX: 28.59 KG/M2 | DIASTOLIC BLOOD PRESSURE: 56 MMHG | HEIGHT: 62 IN | OXYGEN SATURATION: 97 %

## 2024-01-10 DIAGNOSIS — G57.93 NEUROPATHY INVOLVING BOTH LOWER EXTREMITIES: Chronic | ICD-10-CM

## 2024-01-10 DIAGNOSIS — R09.89 BILATERAL CAROTID BRUITS: Chronic | ICD-10-CM

## 2024-01-10 DIAGNOSIS — I73.9 CLAUDICATION OF BOTH LOWER EXTREMITIES: Chronic | ICD-10-CM

## 2024-01-10 DIAGNOSIS — I25.10 CORONARY ARTERY DISEASE INVOLVING NATIVE CORONARY ARTERY OF NATIVE HEART WITHOUT ANGINA PECTORIS: Primary | Chronic | ICD-10-CM

## 2024-01-10 DIAGNOSIS — I10 ESSENTIAL HYPERTENSION: Chronic | ICD-10-CM

## 2024-01-10 DIAGNOSIS — J44.9 CHRONIC OBSTRUCTIVE PULMONARY DISEASE, UNSPECIFIED COPD TYPE: Chronic | ICD-10-CM

## 2024-01-10 DIAGNOSIS — E78.00 HYPERCHOLESTEREMIA: Chronic | ICD-10-CM

## 2024-01-10 PROCEDURE — 93005 ELECTROCARDIOGRAM TRACING: CPT | Mod: PBBFAC | Performed by: INTERNAL MEDICINE

## 2024-01-10 PROCEDURE — 99214 OFFICE O/P EST MOD 30 MIN: CPT | Mod: PBBFAC | Performed by: INTERNAL MEDICINE

## 2024-01-10 PROCEDURE — 99215 OFFICE O/P EST HI 40 MIN: CPT | Mod: S$PBB,,, | Performed by: INTERNAL MEDICINE

## 2024-01-10 PROCEDURE — 93010 ELECTROCARDIOGRAM REPORT: CPT | Mod: S$PBB,,, | Performed by: INTERNAL MEDICINE

## 2024-01-10 RX ORDER — EVOLOCUMAB 140 MG/ML
140 INJECTION, SOLUTION SUBCUTANEOUS
Qty: 6 ML | Refills: 3 | Status: SHIPPED | OUTPATIENT
Start: 2024-01-10 | End: 2025-01-09

## 2024-01-22 ENCOUNTER — HOSPITAL ENCOUNTER (OUTPATIENT)
Dept: RADIOLOGY | Facility: HOSPITAL | Age: 69
Discharge: HOME | End: 2024-01-22
Attending: INTERNAL MEDICINE
Payer: MEDICARE

## 2024-01-22 DIAGNOSIS — R09.89 BILATERAL CAROTID BRUITS: ICD-10-CM

## 2024-01-22 PROCEDURE — 93880 EXTRACRANIAL BILAT STUDY: CPT | Mod: 26,,, | Performed by: RADIOLOGY

## 2024-01-22 PROCEDURE — 93880 EXTRACRANIAL BILAT STUDY: CPT | Mod: TC

## 2024-01-25 NOTE — PROGRESS NOTES
PCP: Charles Romo DO    Referring Provider:     Subjective:   Melody Foster is a 68 y.o. female with hx of CAD (NSTEMI ), HTN, and HLD who presents for 6 month follow up.  Denies any cardiac complaints.       Fhx:  Family History   Problem Relation Age of Onset    Anemia Mother     Crohn's disease Mother     Anxiety disorder Mother      Shx:   Social History     Socioeconomic History    Marital status:    Tobacco Use    Smoking status: Former     Current packs/day: 0.00     Average packs/day: 0.5 packs/day for 30.0 years (15.0 ttl pk-yrs)     Types: Cigarettes     Start date: 1991     Quit date: 2021     Years since quittin.7    Smokeless tobacco: Never   Substance and Sexual Activity    Alcohol use: Never    Drug use: Never     Social Determinants of Health     Housing Stability: Unknown (2021)    Housing Stability Vital Sign     Unable to Pay for Housing in the Last Year: No     Unstable Housing in the Last Year: No       EKG   1/10/24--NSR, T wave abn, 89 bpm  3/28/23--NSR, T wave abn, 74 bpm  22--normal sinus rhythm, nonspecific T wave abn, 73.       Echo    22--Interpretation Summary  · The left ventricle is normal in size with mild concentric hypertrophy and  · The estimated ejection fraction is 60%.  · Normal right ventricular size.  · Mild mitral regurgitation.  · Mild tricuspid regurgitation.  · Mild left atrial enlargement.      Cardiac catheterization    21--Conclusion  · The left ventricular systolic function was normal.  · The pre-procedure left ventricular end diastolic pressure was 20.  · The ejection fraction was calculated to be 65%.  · The RPDA lesion was 70% stenosed.  · The Prox LAD-2 lesion was 95% stenosed.  · The Prox LAD-1 lesion was 95% stenosed with 10% stenosis post-intervention.  · A stent was successfully placed at 12 ORTIZ for 10 sec.  · The estimated blood loss was none.  · There was two vessel coronary artery disease.    The procedure  log was documented by Documenter: Lynne Narvaez RN and verified by Denis Antonio MD.    Date: 5/28/2021  Time: 6:03 PM    3/7/11--Severe 2 vessel CAD.  Acute inferior STEMI.  Successful revascularization of the pRCA.  Successful revascularization of the proximal and mid LAD.  No MR.     Lab Results   Component Value Date     01/24/2023    K 4.4 01/24/2023     01/24/2023    CO2 32 01/24/2023    BUN 18 01/24/2023    CREATININE 1.14 (H) 01/24/2023    CALCIUM 9.4 01/24/2023    ANIONGAP 10 01/24/2023    EGFRNONAA 45 (L) 05/27/2021       Lab Results   Component Value Date    CHOL 212 (H) 09/14/2023    CHOL 216 (H) 01/24/2023    CHOL 217 (H) 09/01/2021     Lab Results   Component Value Date    HDL 37 (L) 09/14/2023    HDL 37 (L) 01/24/2023    HDL 36 (L) 09/01/2021     Lab Results   Component Value Date    LDLCALC 125 09/14/2023    LDLCALC 123 01/24/2023    LDLCALC 129 09/01/2021     Lab Results   Component Value Date    TRIG 249 (H) 09/14/2023    TRIG 278 (H) 01/24/2023    TRIG 258 (H) 09/01/2021     Lab Results   Component Value Date    CHOLHDL 5.7 09/14/2023    CHOLHDL 5.8 01/24/2023    CHOLHDL 6.0 09/01/2021       Lab Results   Component Value Date    WBC 8.26 01/24/2023    HGB 11.1 (L) 01/24/2023    HCT 34.5 (L) 01/24/2023    MCV 89.8 01/24/2023     01/24/2023           Current Outpatient Medications:     albuterol (VENTOLIN HFA) 90 mcg/actuation inhaler, Inhale 2 puffs into the lungs every 6 (six) hours as needed for Wheezing. Rescue, Disp: 18 g, Rfl: 5    aspirin (ECOTRIN) 81 MG EC tablet, Take 81 mg by mouth once daily., Disp: , Rfl:     cholecalciferol, vitamin D3, (VITAMIN D3) 50 mcg (2,000 unit) Cap, Take 1 capsule by mouth once daily., Disp: , Rfl:     cyanocobalamin (VITAMIN B-12) 1000 MCG tablet, Take 1,000 mcg by mouth once daily., Disp: , Rfl:     DULoxetine (CYMBALTA) 60 MG capsule, TAKE 1 CAPSULE NIGHTLY, Disp: 90 capsule, Rfl: 3    ferrous sulfate (FEOSOL) 325 mg (65 mg  "iron) Tab tablet, Take 325 mg by mouth once daily., Disp: , Rfl:     gabapentin (NEURONTIN) 300 MG capsule, Take 1 capsule (300 mg total) by mouth 2 (two) times daily., Disp: 180 capsule, Rfl: 3    losartan-hydrochlorothiazide 100-12.5 mg (HYZAAR) 100-12.5 mg Tab, Take 1 tablet by mouth once daily., Disp: 90 tablet, Rfl: 3    metoprolol succinate (TOPROL-XL) 50 MG 24 hr tablet, Take 1 tablet (50 mg total) by mouth once daily., Disp: 90 tablet, Rfl: 3    omega-3 acid ethyl esters (LOVAZA) 1 gram capsule, , Disp: , Rfl:     pantoprazole (PROTONIX) 40 MG tablet, TAKE 1 TABLET DAILY, Disp: 90 tablet, Rfl: 3    ticagrelor (BRILINTA) 90 mg tablet, Take 1 tablet (90 mg total) by mouth 2 (two) times daily., Disp: 180 tablet, Rfl: 3    trimethobenzamide (TIGAN) 300 mg capsule, Take 300 mg by mouth every 8 (eight) hours as needed., Disp: , Rfl:     evolocumab (REPATHA SURECLICK) 140 mg/mL PnIj, Inject 1 mL (140 mg total) into the skin every 14 (fourteen) days., Disp: 6 mL, Rfl: 3  Did not bring medications.     Review of Systems   Respiratory:  Negative for shortness of breath.    Cardiovascular:  Negative for chest pain, palpitations and leg swelling.   Neurological:  Negative for loss of consciousness.           Objective:   BP (!) 124/56 (BP Location: Left arm, Patient Position: Sitting)   Pulse 75   Ht 5' 2" (1.575 m)   Wt 70.5 kg (155 lb 6.4 oz)   SpO2 97%   BMI 28.42 kg/m²     Physical Exam  Constitutional:       General: She is not in acute distress.     Appearance: Normal appearance.   HENT:      Head: Normocephalic and atraumatic.   Neck:      Vascular: Carotid bruit present. No JVD.      Comments: Bilateral carotid bruits  Cardiovascular:      Rate and Rhythm: Normal rate and regular rhythm.      Pulses: Normal pulses.           Radial pulses are 2+ on the right side and 2+ on the left side.      Heart sounds: Normal heart sounds. No murmur heard.  Pulmonary:      Effort: Pulmonary effort is normal.      Breath " sounds: Normal breath sounds.   Musculoskeletal:      Right lower leg: No edema.      Left lower leg: No edema.   Skin:     General: Skin is warm and dry.   Neurological:      General: No focal deficit present.      Mental Status: She is alert.           Assessment:     1. Coronary artery disease involving native coronary artery of native heart without angina pectoris  EKG 12-lead    EKG 12-lead    s/p NSTEMI 5/21      2. Bilateral carotid bruits  Radiology US Carotid Bilateral      3. Hypercholesteremia  evolocumab (REPATHA SURECLICK) 140 mg/mL PnIj      4. Essential hypertension        5. Chronic obstructive pulmonary disease, unspecified COPD type        6. Claudication of both lower extremities        7. Neuropathy involving both lower extremities              Plan:   Continue current medications  Carotid u/s  Call with results  F/u in 6 months.

## 2024-01-26 PROBLEM — G57.93 NEUROPATHY INVOLVING BOTH LOWER EXTREMITIES: Chronic | Status: ACTIVE | Noted: 2024-01-26

## 2024-01-26 PROBLEM — I73.9 CLAUDICATION OF BOTH LOWER EXTREMITIES: Chronic | Status: ACTIVE | Noted: 2024-01-26

## 2024-01-26 PROBLEM — R09.89 BILATERAL CAROTID BRUITS: Chronic | Status: ACTIVE | Noted: 2024-01-26

## 2024-03-08 DIAGNOSIS — E78.00 HYPERCHOLESTEREMIA: Primary | ICD-10-CM

## 2024-03-08 DIAGNOSIS — Z79.899 ENCOUNTER FOR LONG-TERM (CURRENT) USE OF OTHER MEDICATIONS: ICD-10-CM

## 2024-04-08 RX ORDER — TICAGRELOR 90 MG/1
90 TABLET ORAL 2 TIMES DAILY
Qty: 180 TABLET | Refills: 3 | Status: SHIPPED | OUTPATIENT
Start: 2024-04-08

## 2024-05-03 ENCOUNTER — TELEPHONE (OUTPATIENT)
Dept: INTERNAL MEDICINE | Facility: CLINIC | Age: 69
End: 2024-05-03
Payer: MEDICARE

## 2024-05-03 DIAGNOSIS — I10 ESSENTIAL HYPERTENSION: Primary | ICD-10-CM

## 2024-05-03 NOTE — TELEPHONE ENCOUNTER
Spoke to pt and she stated Dr. Wright was going to start her on medication for itching but she had to have labs done first. She had those done at [facility] and she was informed she is unable to start medication due to elevated creatinine and elevated liver enzymes. Pt states she has never been made aware that she has this issue. I have informed pt I am sending over RICKIE and will have dr patiño review them and get back with her 05/06/24 when he is back in office. Pt thanked me

## 2024-05-03 NOTE — LETTER
AUTHORIZATION FOR RELEASE OF   CONFIDENTIAL INFORMATION    Dear [facility],    We are seeing Melody Foster, date of birth 1955, in the clinic at [facility] INTER[facility]. Charles Romo DO is the patient's PCP. Melody Foster has an outstanding lab/procedure at the time we reviewed her chart. In order to help keep her health information updated, she has authorized us to request the following medical record(s):        (  )  MAMMOGRAM                                      (  )  COLONOSCOPY      (  )  PAP SMEAR                                          (  )  OUTSIDE LAB RESULTS     (  )  DEXA SCAN                                          (  )  EYE EXAM            (  )  FOOT EXAM                                          (  )  ENTIRE RECORD     (  )  OUTSIDE IMMUNIZATIONS                 ( X )  RECENT LABS ORDERED BY DR HANLEY.          Please fax records to [facility], Swift, Christopher W, DO, [phone]      If you have any questions, please contact Susi Pisano LPN at [phone].           Patient Name: Melody Foster  : 1955  Patient Phone #: [phone]

## 2024-05-03 NOTE — TELEPHONE ENCOUNTER
----- Message from Norma Hughes sent at 5/3/2024 11:20 AM CDT -----  Patient called needing to speak to a nurse about getting her lab work looked at and evaluated. A good phone number to reach her is [phone].

## 2024-05-06 NOTE — TELEPHONE ENCOUNTER
Spoke to pt and let her know Dr patiño has reviewed labs and does not want to make any changes. He has advised that we can repeat labs at the end of July.

## 2024-05-30 RX ORDER — METOPROLOL SUCCINATE 50 MG/1
50 TABLET, EXTENDED RELEASE ORAL
Qty: 90 TABLET | Refills: 3 | Status: SHIPPED | OUTPATIENT
Start: 2024-05-30

## 2024-05-30 RX ORDER — LOSARTAN POTASSIUM AND HYDROCHLOROTHIAZIDE 12.5; 1 MG/1; MG/1
1 TABLET ORAL
Qty: 90 TABLET | Refills: 3 | Status: SHIPPED | OUTPATIENT
Start: 2024-05-30

## 2024-05-30 RX ORDER — DULOXETIN HYDROCHLORIDE 60 MG/1
CAPSULE, DELAYED RELEASE ORAL
Qty: 90 CAPSULE | Refills: 3 | Status: SHIPPED | OUTPATIENT
Start: 2024-05-30

## 2024-05-31 DIAGNOSIS — Z12.31 BREAST CANCER SCREENING BY MAMMOGRAM: Primary | ICD-10-CM

## 2024-06-25 ENCOUNTER — OFFICE VISIT (OUTPATIENT)
Dept: INTERNAL MEDICINE | Facility: CLINIC | Age: 69
End: 2024-06-25
Payer: MEDICARE

## 2024-06-25 VITALS
SYSTOLIC BLOOD PRESSURE: 130 MMHG | HEIGHT: 62 IN | OXYGEN SATURATION: 93 % | DIASTOLIC BLOOD PRESSURE: 62 MMHG | BODY MASS INDEX: 30.91 KG/M2 | HEART RATE: 72 BPM | TEMPERATURE: 98 F | WEIGHT: 168 LBS | RESPIRATION RATE: 16 BRPM

## 2024-06-25 DIAGNOSIS — R09.89 BILATERAL CAROTID BRUITS: Chronic | ICD-10-CM

## 2024-06-25 DIAGNOSIS — M48.00 SPINAL STENOSIS, UNSPECIFIED SPINAL REGION: Chronic | ICD-10-CM

## 2024-06-25 DIAGNOSIS — I65.23 BILATERAL CAROTID ARTERY STENOSIS: ICD-10-CM

## 2024-06-25 DIAGNOSIS — R20.0 NUMBNESS IN FEET: ICD-10-CM

## 2024-06-25 DIAGNOSIS — E78.5 DYSLIPIDEMIA: ICD-10-CM

## 2024-06-25 DIAGNOSIS — R73.03 PRE-DIABETES: ICD-10-CM

## 2024-06-25 DIAGNOSIS — J44.9 CHRONIC OBSTRUCTIVE PULMONARY DISEASE, UNSPECIFIED COPD TYPE: ICD-10-CM

## 2024-06-25 DIAGNOSIS — I10 ESSENTIAL HYPERTENSION: ICD-10-CM

## 2024-06-25 DIAGNOSIS — G57.93 NEUROPATHY INVOLVING BOTH LOWER EXTREMITIES: Chronic | ICD-10-CM

## 2024-06-25 DIAGNOSIS — E55.9 VITAMIN D DEFICIENCY: ICD-10-CM

## 2024-06-25 DIAGNOSIS — Z09 FOLLOW-UP EXAM: Primary | ICD-10-CM

## 2024-06-25 DIAGNOSIS — K21.9 GASTROESOPHAGEAL REFLUX DISEASE, UNSPECIFIED WHETHER ESOPHAGITIS PRESENT: ICD-10-CM

## 2024-06-25 DIAGNOSIS — I73.9 CLAUDICATION OF BOTH LOWER EXTREMITIES: Chronic | ICD-10-CM

## 2024-06-25 PROCEDURE — 99999 PR PBB SHADOW E&M-EST. PATIENT-LVL V: CPT | Mod: PBBFAC,,, | Performed by: INTERNAL MEDICINE

## 2024-06-25 PROCEDURE — 99215 OFFICE O/P EST HI 40 MIN: CPT | Mod: S$PBB,,, | Performed by: INTERNAL MEDICINE

## 2024-06-25 PROCEDURE — 99215 OFFICE O/P EST HI 40 MIN: CPT | Mod: PBBFAC | Performed by: INTERNAL MEDICINE

## 2024-06-25 RX ORDER — PANTOPRAZOLE SODIUM 40 MG/1
TABLET, DELAYED RELEASE ORAL
Qty: 90 TABLET | Refills: 3 | Status: SHIPPED | OUTPATIENT
Start: 2024-06-25

## 2024-06-25 NOTE — PROGRESS NOTES
Subjective:       Patient ID: Melody Foster is a 69 y.o. female.    Chief Complaint: Follow-up (Patient is here for routine follow up. Patient c/o neuropathy getting worse. Patient states that at last appt, magnesium was supposed to be sent into pharmacy, and she has not received anything, not on med list)    The patient is a 69-year-old white female the presents today for follow-up.  She has a history of hypertension, dyslipidemia, GERD, polyneuropathy, carotid artery stenosis, and coronary artery disease.   She is currently on Brilinta and aspirin.  Currently denies any chest pain.  She does have dyspnea on exertion.  She does have a significant history of smoking but quit May 27, 2021.  Likely a component of COPD as well.  Her biggest complaint continues to be neuropathy.  She is scared to increase the gabapentin for fear of dementia.  Currently she only takes it once daily.  We have discussed other options but at this time she wants to hold off.  She did have nerve conduction study done back in February which confirmed neuropathy.  She also complains of worsening dyspnea on exertion.  She states her legs also feel heavy and fatigued with minimal activity.  She has had ABIs in the past but it has been awhile.  Blood pressure today looks okay.  It is 130/62.  She is due for another colonoscopy.  She is currently resting comfortably in no distress.  She is afebrile and vital signs are stable.        Follow-up  Associated symptoms include numbness and weakness. Pertinent negatives include no abdominal pain, arthralgias, chest pain, chills, congestion, coughing, fatigue, fever, headaches, joint swelling, myalgias, nausea, neck pain, rash or sore throat.     Review of Systems   Constitutional:  Negative for appetite change, chills, fatigue and fever.   HENT:  Negative for nasal congestion, ear pain, hearing loss, sinus pressure/congestion and sore throat.    Eyes:  Negative for pain, redness and visual disturbance.    Respiratory:  Positive for shortness of breath. Negative for apnea, cough and wheezing.    Cardiovascular:  Positive for claudication. Negative for chest pain and palpitations.   Gastrointestinal:  Negative for abdominal pain, constipation, diarrhea and nausea.   Endocrine: Negative for cold intolerance, heat intolerance and polyuria.   Genitourinary:  Negative for dysuria and hematuria.   Musculoskeletal:  Negative for arthralgias, back pain, joint swelling, myalgias and neck pain.   Integumentary:  Negative for pallor, rash and wound.   Allergic/Immunologic: Negative for immunocompromised state.   Neurological:  Positive for weakness and numbness. Negative for tremors, seizures, headaches and memory loss.   Hematological:  Negative for adenopathy.   Psychiatric/Behavioral:  Negative for confusion, dysphoric mood and sleep disturbance. The patient is not nervous/anxious.          Objective:      Physical Exam  Vitals and nursing note reviewed.   Constitutional:       General: She is not in acute distress.     Appearance: Normal appearance. She is obese. She is not ill-appearing.   HENT:      Head: Normocephalic and atraumatic.      Right Ear: External ear normal.      Left Ear: External ear normal.      Nose: Nose normal.      Mouth/Throat:      Pharynx: Oropharynx is clear.   Eyes:      Extraocular Movements: Extraocular movements intact.      Conjunctiva/sclera: Conjunctivae normal.      Pupils: Pupils are equal, round, and reactive to light.   Neck:      Vascular: Carotid bruit present.   Cardiovascular:      Rate and Rhythm: Normal rate and regular rhythm.      Pulses: Normal pulses.      Heart sounds: Murmur heard.      Comments: 2-3/6 systolic murmur heard best at 2nd ICS on right with radiation to carotids  Pulmonary:      Effort: No respiratory distress.      Breath sounds: Normal breath sounds. No wheezing or rales.   Abdominal:      General: Bowel sounds are normal. There is no distension.       Palpations: Abdomen is soft.   Musculoskeletal:         General: Normal range of motion.      Cervical back: Normal range of motion and neck supple.      Right lower leg: No edema.      Left lower leg: No edema.   Skin:     General: Skin is warm and dry.      Capillary Refill: Capillary refill takes less than 2 seconds.      Coloration: Skin is not pale.   Neurological:      General: No focal deficit present.      Mental Status: She is alert and oriented to person, place, and time.      Cranial Nerves: No cranial nerve deficit.      Motor: No weakness.      Gait: Gait normal.   Psychiatric:         Mood and Affect: Mood normal.         Judgment: Judgment normal.         Assessment:       1. Follow-up exam    2. Spinal stenosis, unspecified spinal region    3. Neuropathy involving both lower extremities    4. Numbness in feet    5. Chronic obstructive pulmonary disease, unspecified COPD type    6. Bilateral carotid bruits    7. Claudication of both lower extremities    8. Bilateral carotid artery stenosis    9. Essential hypertension    10. Dyslipidemia    11. Vitamin D deficiency    12. Gastroesophageal reflux disease, unspecified whether esophagitis present    13. Pre-diabetes        Plan:       1. She presents today for follow-up. She is up-to-date on mammogram---next 1 scheduled for July 26th.  Last colonoscopy was in 2018 and she was to return in 5 years.  Therefore it is time for colonoscopy.  She wants to wait until at least September before getting that set up.  We are going to check some lab work today.    2. Essential hypertension-blood pressure looks good.  It is 130/62. Patient is currently on losartan/hydrochlorothiazide 100/12.5, metoprolol 50 mg daily.    3. GERD-stable.  Continue with PPI    4. Coronary artery-disease-last stent placed on May 28th of 2021  She is on Brilinta and aspirin.  She follows with Cardiology.  Currently denies any chest pain.  She does complain of dyspnea on exertion.  This is  likely related to COPD but could be an anginal equivalent.  She has a follow-up with cardiology next month.    5. Dyslipidemia-patient currently on Repatha.  We can check a lipid panel.    6. Tobacco dependence-patient quit smoking on May 27th 2021.  She is doing well with this.  I have encouraged her to continue with cessation.  No smoking since then    7. Spinal stenosis-she had surgical repair March 31, 2022.  She still has symptoms of neuropathy in her feet.  She is now willing to try the gabapentin to see if that will help  9/14/23-Neurontin not really helping much.  Although it does help a little bit.  We have discussed doubling up on her dose at bedtime to see if that will help.  She saw provider in [place] and she has a nerve conduction test upcoming.  6/25/24-nerve conduction consistent with polyneuropathy.  Etiology not certain.  She is scared to increase her take any more than 1 tablet the gabapentin for fear of dementia.  We have also discussed amitriptyline.  She wants to think about it.    8. Bilateral hand numbness/paresthesias-likely a component of carpal tunnel syndrome.  We have discussed trying wrist splints at bedtime--seems to be doing better    9. Pre diabetes-A1c 6.1%.  Plan to repeat an A1c today    10. Possible claudication-bilateral lower extremities.  We are going to set her up for ABIs    11. Dyspnea on exertion-long history of smoking.  Likely has a component of COPD.  We are going to set her up for PFTs    Billing based on 40 minutes of time spent on this encounter      Return to care in 4 months

## 2024-06-25 NOTE — PATIENT INSTRUCTIONS
MARION Hansen 07/01/2024 1:00 p.pm   You will need to go in by admissions (next to er) and go to radiology department.

## 2024-07-09 DIAGNOSIS — Z71.89 COMPLEX CARE COORDINATION: ICD-10-CM

## 2024-07-09 RX ORDER — GABAPENTIN 300 MG/1
300 CAPSULE ORAL 2 TIMES DAILY
Qty: 180 CAPSULE | Refills: 3 | Status: SHIPPED | OUTPATIENT
Start: 2024-07-09

## 2024-07-11 ENCOUNTER — CLINICAL SUPPORT (OUTPATIENT)
Dept: PULMONOLOGY | Facility: HOSPITAL | Age: 69
End: 2024-07-11
Attending: INTERNAL MEDICINE
Payer: MEDICARE

## 2024-07-11 VITALS — OXYGEN SATURATION: 95 %

## 2024-07-11 DIAGNOSIS — J44.9 CHRONIC OBSTRUCTIVE PULMONARY DISEASE, UNSPECIFIED COPD TYPE: ICD-10-CM

## 2024-07-11 PROCEDURE — 94726 PLETHYSMOGRAPHY LUNG VOLUMES: CPT

## 2024-07-11 PROCEDURE — 94729 DIFFUSING CAPACITY: CPT

## 2024-07-11 PROCEDURE — 27100098 HC SPACER

## 2024-07-11 PROCEDURE — 94060 EVALUATION OF WHEEZING: CPT

## 2024-07-11 PROCEDURE — 94727 GAS DIL/WSHOT DETER LNG VOL: CPT

## 2024-07-11 NOTE — PROGRESS NOTES
Testing performed with patient in sitting position.  Patient bronchodilated with 4 puffs of Albuterol with repeat testing performed.  Good cooperation and effort. Unable to meet ATS for repeatability criteria for RAW/TGV, best effort is reported.  JACKELINE Calderon, RRT

## 2024-07-12 NOTE — PROCEDURES
Pulmonary function test   Forced vital capacity 2.31 L 88% predicted   FEV1 1.65 L 81% predicted   FEV1 ratio 71%   Small airways disease broncho reactivity   Normal lung volumes  Normal DLCO  Flow volume loop shows scooped out limb consistent with small airways disease  Mild obstructive ventilatory impairment with broncho reactivity of the small airways

## 2024-07-15 ENCOUNTER — OFFICE VISIT (OUTPATIENT)
Dept: CARDIOLOGY | Facility: CLINIC | Age: 69
End: 2024-07-15
Payer: MEDICARE

## 2024-07-15 DIAGNOSIS — G57.93 NEUROPATHY INVOLVING BOTH LOWER EXTREMITIES: Chronic | ICD-10-CM

## 2024-07-15 DIAGNOSIS — I25.10 CORONARY ARTERY DISEASE INVOLVING NATIVE CORONARY ARTERY OF NATIVE HEART WITHOUT ANGINA PECTORIS: Primary | ICD-10-CM

## 2024-07-15 DIAGNOSIS — R09.89 BILATERAL CAROTID BRUITS: Chronic | ICD-10-CM

## 2024-07-15 DIAGNOSIS — E78.00 HYPERCHOLESTEREMIA: ICD-10-CM

## 2024-07-15 DIAGNOSIS — J44.9 CHRONIC OBSTRUCTIVE PULMONARY DISEASE, UNSPECIFIED COPD TYPE: Chronic | ICD-10-CM

## 2024-07-15 DIAGNOSIS — I10 ESSENTIAL HYPERTENSION: Chronic | ICD-10-CM

## 2024-07-15 PROCEDURE — 93010 ELECTROCARDIOGRAM REPORT: CPT | Mod: S$PBB,,, | Performed by: INTERNAL MEDICINE

## 2024-07-15 PROCEDURE — 99213 OFFICE O/P EST LOW 20 MIN: CPT | Mod: PBBFAC,25 | Performed by: INTERNAL MEDICINE

## 2024-07-15 PROCEDURE — 99999 PR PBB SHADOW E&M-EST. PATIENT-LVL III: CPT | Mod: PBBFAC,,, | Performed by: INTERNAL MEDICINE

## 2024-07-15 PROCEDURE — 93005 ELECTROCARDIOGRAM TRACING: CPT | Mod: PBBFAC | Performed by: INTERNAL MEDICINE

## 2024-07-15 PROCEDURE — 99214 OFFICE O/P EST MOD 30 MIN: CPT | Mod: S$PBB,,, | Performed by: INTERNAL MEDICINE

## 2024-07-16 ENCOUNTER — TELEPHONE (OUTPATIENT)
Dept: CARDIOLOGY | Facility: CLINIC | Age: 69
End: 2024-07-16
Payer: MEDICARE

## 2024-07-16 LAB
OHS QRS DURATION: 74 MS
OHS QTC CALCULATION: 437 MS

## 2024-07-26 ENCOUNTER — HOSPITAL ENCOUNTER (OUTPATIENT)
Dept: RADIOLOGY | Facility: HOSPITAL | Age: 69
Discharge: HOME | End: 2024-07-26
Attending: INTERNAL MEDICINE
Payer: MEDICARE

## 2024-07-26 VITALS — WEIGHT: 151 LBS | BODY MASS INDEX: 27.62 KG/M2

## 2024-07-26 DIAGNOSIS — Z12.31 BREAST CANCER SCREENING BY MAMMOGRAM: ICD-10-CM

## 2024-07-26 PROCEDURE — 77063 BREAST TOMOSYNTHESIS BI: CPT | Mod: TC

## 2024-07-26 PROCEDURE — 77067 SCR MAMMO BI INCL CAD: CPT | Mod: TC

## 2024-08-16 NOTE — PROGRESS NOTES
PCP: Charles Romo DO    Referring Provider:     Subjective:   Melody Foster is a 69 y.o. female with hx of CAD (NSTEMI 5/21), HTN, COPD, bilateral carotid bruits, bilateral LE neuropathy, and HLD who presents for 6 month follow up.  C/p SOB with leg weakness.  PFTs done last week ordered by Dr. Romo.     1/10/24--Melody Foster is a 69 y.o. female with hx of CAD (NSTEMI 5/21), HTN, and HLD who presents for 6 month follow up.  Denies any cardiac complaints.       Fhx:  Family History   Problem Relation Name Age of Onset    Anemia Mother      Crohn's disease Mother      Anxiety disorder Mother       Shx:   Social History     Socioeconomic History    Marital status:    Tobacco Use    Smoking status: Former     Current packs/day: 0.00     Average packs/day: 0.5 packs/day for 30.0 years (15.0 ttl pk-yrs)     Types: Cigarettes     Start date: 05/1991     Quit date: 05/2021     Years since quitting: 3.2    Smokeless tobacco: Never   Substance and Sexual Activity    Alcohol use: Never    Drug use: Never     Social Determinants of Health     Financial Resource Strain: Low Risk  (7/10/2024)    Overall Financial Resource Strain (CARDIA)     Difficulty of Paying Living Expenses: Not hard at all   Food Insecurity: No Food Insecurity (7/10/2024)    Hunger Vital Sign     Worried About Running Out of Food in the Last Year: Never true     Ran Out of Food in the Last Year: Never true   Physical Activity: Insufficiently Active (7/10/2024)    Exercise Vital Sign     Days of Exercise per Week: 1 day     Minutes of Exercise per Session: 20 min   Stress: No Stress Concern Present (7/10/2024)    [employer] - Occupational Stress Questionnaire     Feeling of Stress : Not at all   Housing Stability: Unknown (6/7/2021)    Housing Stability Vital Sign     Unable to Pay for Housing in the Last Year: No     Unstable Housing in the Last Year: No       EKG   7/15/24--NSR, T wave abn, 75  bpm  1/10/24--NSR, T wave abn, 89 bpm  3/28/23--NSR, T wave abn, 74 bpm  8/23/22--normal sinus rhythm, nonspecific T wave abn, 73.       Echo    1/28/22--Interpretation Summary  · The left ventricle is normal in size with mild concentric hypertrophy and  · The estimated ejection fraction is 60%.  · Normal right ventricular size.  · Mild mitral regurgitation.  · Mild tricuspid regurgitation.  · Mild left atrial enlargement.      Cardiac catheterization    5/27/21--Conclusion  · The left ventricular systolic function was normal.  · The pre-procedure left ventricular end diastolic pressure was 20.  · The ejection fraction was calculated to be 65%.  · The RPDA lesion was 70% stenosed.  · The Prox LAD-2 lesion was 95% stenosed.  · The Prox LAD-1 lesion was 95% stenosed with 10% stenosis post-intervention.  · A stent was successfully placed at 12 ORTIZ for 10 sec.  · The estimated blood loss was none.  · There was two vessel coronary artery disease.    The procedure log was documented by Documenter: Lynne Narvaez RN and verified by Denis Antonio MD.    Date: 5/28/2021  Time: 6:03 PM    3/7/11--Severe 2 vessel CAD.  Acute inferior STEMI.  Successful revascularization of the pRCA.  Successful revascularization of the proximal and mid LAD.  No MR.     Lab Results   Component Value Date     06/25/2024    K 4.3 06/25/2024     06/25/2024    CO2 31 06/25/2024    BUN 23 (H) 06/25/2024    CREATININE 1.21 (H) 06/25/2024    CALCIUM 9.0 06/25/2024    ANIONGAP 8 06/25/2024    EGFRNONAA 45 (L) 05/27/2021       Lab Results   Component Value Date    CHOL 134 07/15/2024    CHOL 212 (H) 09/14/2023    CHOL 216 (H) 01/24/2023     Lab Results   Component Value Date    HDL 36 (L) 07/15/2024    HDL 37 (L) 09/14/2023    HDL 37 (L) 01/24/2023     Lab Results   Component Value Date    LDLCALC 51 07/15/2024    LDLCALC 125 09/14/2023    LDLCALC 123 01/24/2023     Lab Results   Component Value Date    TRIG 237 (H) 07/15/2024     TRIG 249 (H) 09/14/2023    TRIG 278 (H) 01/24/2023     Lab Results   Component Value Date    CHOLHDL 3.7 07/15/2024    CHOLHDL 5.7 09/14/2023    CHOLHDL 5.8 01/24/2023       Lab Results   Component Value Date    WBC 5.32 06/25/2024    HGB 10.1 (L) 06/25/2024    HCT 32.3 (L) 06/25/2024    MCV 89.7 06/25/2024     06/25/2024           Current Outpatient Medications:     albuterol (VENTOLIN HFA) 90 mcg/actuation inhaler, Inhale 2 puffs into the lungs every 6 (six) hours as needed for Wheezing. Rescue, Disp: 18 g, Rfl: 5    aspirin (ECOTRIN) 81 MG EC tablet, Take 81 mg by mouth once daily., Disp: , Rfl:     BRILINTA 90 mg tablet, TAKE 1 TABLET TWICE A DAY, Disp: 180 tablet, Rfl: 3    cholecalciferol, vitamin D3, (VITAMIN D3) 50 mcg (2,000 unit) Cap, Take 1 capsule by mouth once daily., Disp: , Rfl:     cyanocobalamin (VITAMIN B-12) 1000 MCG tablet, Take 1,000 mcg by mouth once daily., Disp: , Rfl:     DULoxetine (CYMBALTA) 60 MG capsule, TAKE 1 CAPSULE NIGHTLY, Disp: 90 capsule, Rfl: 3    evolocumab (REPATHA SURECLICK) 140 mg/mL PnIj, Inject 1 mL (140 mg total) into the skin every 14 (fourteen) days., Disp: 6 mL, Rfl: 3    gabapentin (NEURONTIN) 300 MG capsule, TAKE 1 CAPSULE TWICE A DAY, Disp: 180 capsule, Rfl: 3    losartan-hydrochlorothiazide 100-12.5 mg (HYZAAR) 100-12.5 mg Tab, TAKE 1 TABLET DAILY, Disp: 90 tablet, Rfl: 3    metoprolol succinate (TOPROL-XL) 50 MG 24 hr tablet, TAKE 1 TABLET DAILY, Disp: 90 tablet, Rfl: 3    pantoprazole (PROTONIX) 40 MG tablet, TAKE 1 TABLET DAILY, Disp: 90 tablet, Rfl: 3    trimethobenzamide (TIGAN) 300 mg capsule, Take 300 mg by mouth every 8 (eight) hours as needed., Disp: , Rfl:     ferrous sulfate (FEOSOL) 325 mg (65 mg iron) Tab tablet, Take 325 mg by mouth once daily. (Patient not taking: Reported on 6/25/2024), Disp: , Rfl:   Did not bring medications.     Review of Systems   Respiratory:  Positive for shortness of breath.    Cardiovascular:  Negative for chest  pain, palpitations and leg swelling.   Neurological:  Negative for loss of consciousness.           Objective:   There were no vitals taken for this visit.    Physical Exam  Constitutional:       General: She is not in acute distress.     Appearance: Normal appearance.   HENT:      Head: Normocephalic and atraumatic.   Neck:      Vascular: Carotid bruit present. No JVD.      Comments: Bilateral carotid bruits, loud  Cardiovascular:      Rate and Rhythm: Normal rate and regular rhythm.      Pulses: Normal pulses.           Radial pulses are 2+ on the right side and 2+ on the left side.      Heart sounds: Murmur heard.      Systolic murmur is present with a grade of 2/6.      Comments: II/ VI MICHAEL RUSB  Pulmonary:      Effort: Pulmonary effort is normal.      Breath sounds: Normal breath sounds.   Musculoskeletal:      Right lower leg: No edema.      Left lower leg: No edema.   Skin:     General: Skin is warm and dry.   Neurological:      Mental Status: She is alert.           Assessment:     1. Coronary artery disease involving native coronary artery of native heart without angina pectoris  EKG 12-lead    EKG 12-lead    s/p NSTEMI 5/21      2. Hypercholesteremia  Lipid Panel    on Repatha      3. Chronic obstructive pulmonary disease, unspecified COPD type        4. Essential hypertension        5. Bilateral carotid bruits        6. Neuropathy involving both lower extremities              Plan:   Continue current medications  Recheck FLP/ ALT  Contact Dr. Romo to notify pt of PFT results.   F/u in 6 months.

## 2025-01-16 ENCOUNTER — OFFICE VISIT (OUTPATIENT)
Dept: FAMILY MEDICINE | Facility: CLINIC | Age: 70
End: 2025-01-16
Payer: MEDICARE

## 2025-01-16 VITALS
BODY MASS INDEX: 26.9 KG/M2 | OXYGEN SATURATION: 97 % | HEIGHT: 62 IN | SYSTOLIC BLOOD PRESSURE: 112 MMHG | TEMPERATURE: 97 F | DIASTOLIC BLOOD PRESSURE: 52 MMHG | HEART RATE: 85 BPM | WEIGHT: 146.19 LBS

## 2025-01-16 DIAGNOSIS — F17.200 TOBACCO USE DISORDER: ICD-10-CM

## 2025-01-16 DIAGNOSIS — E55.9 VITAMIN D DEFICIENCY: ICD-10-CM

## 2025-01-16 DIAGNOSIS — K21.9 GASTROESOPHAGEAL REFLUX DISEASE, UNSPECIFIED WHETHER ESOPHAGITIS PRESENT: ICD-10-CM

## 2025-01-16 DIAGNOSIS — I25.10 CORONARY ARTERY DISEASE INVOLVING NATIVE CORONARY ARTERY OF NATIVE HEART WITHOUT ANGINA PECTORIS: ICD-10-CM

## 2025-01-16 DIAGNOSIS — I65.23 BILATERAL CAROTID ARTERY STENOSIS: ICD-10-CM

## 2025-01-16 DIAGNOSIS — I10 ESSENTIAL HYPERTENSION: ICD-10-CM

## 2025-01-16 DIAGNOSIS — Z09 FOLLOW-UP EXAM: Primary | ICD-10-CM

## 2025-01-16 DIAGNOSIS — G57.93 NEUROPATHY INVOLVING BOTH LOWER EXTREMITIES: Chronic | ICD-10-CM

## 2025-01-16 DIAGNOSIS — J44.9 CHRONIC OBSTRUCTIVE PULMONARY DISEASE, UNSPECIFIED COPD TYPE: ICD-10-CM

## 2025-01-16 DIAGNOSIS — E78.5 DYSLIPIDEMIA: ICD-10-CM

## 2025-01-16 DIAGNOSIS — R13.19 ESOPHAGEAL DYSPHAGIA: ICD-10-CM

## 2025-01-16 PROCEDURE — 99215 OFFICE O/P EST HI 40 MIN: CPT | Mod: ,,, | Performed by: INTERNAL MEDICINE

## 2025-01-16 RX ORDER — TRIMETHOBENZAMIDE HYDROCHLORIDE 300 MG/1
300 CAPSULE ORAL EVERY 8 HOURS PRN
Qty: 30 CAPSULE | Refills: 3 | Status: SHIPPED | OUTPATIENT
Start: 2025-01-16

## 2025-01-16 RX ORDER — HYDROXYZINE HYDROCHLORIDE 25 MG/1
25 TABLET, FILM COATED ORAL NIGHTLY
COMMUNITY
Start: 2025-01-13

## 2025-01-16 RX ORDER — DUPILUMAB 300 MG/2ML
INJECTION, SOLUTION SUBCUTANEOUS
COMMUNITY
Start: 2024-06-19

## 2025-01-16 RX ORDER — TRIAMCINOLONE ACETONIDE 1 MG/G
CREAM TOPICAL
COMMUNITY
Start: 2024-04-30

## 2025-01-16 NOTE — PROGRESS NOTES
Subjective:       Patient ID: Melody Foster is a 69 y.o. female.    Chief Complaint: No chief complaint on file.    The patient is a 69-year-old white female the presents today for follow-up.  She has a history of hypertension, dyslipidemia, GERD, polyneuropathy, carotid artery stenosis, and coronary artery disease.   She is currently just on the aspirin.  The Brilinta was discontinued by her cardiologist. Currently denies any chest pain.  She does have dyspnea on exertion.  She does have a significant history of smoking but quit May 27, 2021.  Her breathing is stable  Her biggest complaint continues to be neuropathy.  She is no longer taking the gabapentin based on information she read online.  She inquires about 10s unit and medical marijuana.   She did have nerve conduction study done back in February 2024 which confirmed neuropathy.  Otherwise she states that she is doing okay.  Blood pressure today is 112/52.  We have discussed colonoscopy and she is in contemplation stage.  She is currently afebrile and vital signs are stable.        Follow-up  Associated symptoms include numbness and weakness. Pertinent negatives include no abdominal pain, arthralgias, chest pain, chills, congestion, coughing, fatigue, fever, headaches, joint swelling, myalgias, nausea, neck pain, rash or sore throat.     Review of Systems   Constitutional:  Negative for appetite change, chills, fatigue and fever.   HENT:  Negative for nasal congestion, ear pain, hearing loss, sinus pressure/congestion and sore throat.    Eyes:  Negative for pain, redness and visual disturbance.   Respiratory:  Negative for apnea, cough, shortness of breath and wheezing.    Cardiovascular:  Negative for chest pain, palpitations and claudication.   Gastrointestinal:  Negative for abdominal pain, constipation, diarrhea and nausea.   Endocrine: Negative for cold intolerance, heat intolerance and polyuria.   Genitourinary:  Negative for dysuria and hematuria.    Musculoskeletal:  Negative for arthralgias, back pain, joint swelling, myalgias and neck pain.   Integumentary:  Negative for pallor, rash and wound.   Allergic/Immunologic: Negative for immunocompromised state.   Neurological:  Positive for weakness and numbness. Negative for tremors, seizures, headaches and memory loss.   Hematological:  Negative for adenopathy.   Psychiatric/Behavioral:  Negative for confusion, dysphoric mood and sleep disturbance. The patient is not nervous/anxious.          Objective:      Physical Exam  Vitals and nursing note reviewed.   Constitutional:       General: She is not in acute distress.     Appearance: Normal appearance. She is obese. She is not ill-appearing.   HENT:      Head: Normocephalic and atraumatic.      Right Ear: External ear normal.      Left Ear: External ear normal.      Nose: Nose normal.      Mouth/Throat:      Pharynx: Oropharynx is clear.   Eyes:      Extraocular Movements: Extraocular movements intact.      Conjunctiva/sclera: Conjunctivae normal.      Pupils: Pupils are equal, round, and reactive to light.   Neck:      Vascular: Carotid bruit present.   Cardiovascular:      Rate and Rhythm: Normal rate and regular rhythm.      Pulses: Normal pulses.      Heart sounds: Murmur heard.      Comments: 2-3/6 systolic murmur heard best at 2nd ICS on right with radiation to carotids  Pulmonary:      Effort: No respiratory distress.      Breath sounds: Normal breath sounds. No wheezing or rales.   Abdominal:      General: Bowel sounds are normal. There is no distension.      Palpations: Abdomen is soft.   Musculoskeletal:         General: Normal range of motion.      Cervical back: Normal range of motion and neck supple.      Right lower leg: No edema.      Left lower leg: No edema.   Skin:     General: Skin is warm and dry.      Capillary Refill: Capillary refill takes less than 2 seconds.      Coloration: Skin is not pale.   Neurological:      General: No focal  deficit present.      Mental Status: She is alert and oriented to person, place, and time.      Cranial Nerves: No cranial nerve deficit.      Sensory: Sensory deficit present.      Motor: No weakness.      Gait: Gait normal.   Psychiatric:         Mood and Affect: Mood normal.         Judgment: Judgment normal.         Assessment:       1. Follow-up exam    2. Neuropathy involving both lower extremities    3. Chronic obstructive pulmonary disease, unspecified COPD type    4. Bilateral carotid artery stenosis    5. Essential hypertension    6. Coronary artery disease involving native coronary artery of native heart without angina pectoris    7. Dyslipidemia    8. Gastroesophageal reflux disease, unspecified whether esophagitis present    9. Tobacco use disorder    10. Vitamin D deficiency    11. Esophageal dysphagia        Plan:       1. She presents today for follow-up. She is up-to-date on mammogram. Last colonoscopy was in 2018 and she was to return in 5 years.  Therefore it is time for colonoscopy.  We have discussed this and she is in contemplation stage.  She has been having some esophageal dysphagia.  We are going to make a referral to GI.    2. Essential hypertension-blood pressure looks good.  It is 112/52. Patient is currently on losartan/hydrochlorothiazide 100/12.5, metoprolol 50 mg daily.    3. GERD-stable.  Continue with PPI    4. Coronary artery-disease-last stent placed on May 28th of 2021  She is no longer on Brilinta.  She follows with Cardiology.  Currently denies any chest pain.  She does complain of dyspnea on exertion.  This is likely related to COPD but could be an anginal equivalent.  She has a follow-up with cardiology next month.    5. Dyslipidemia-patient currently on Repatha.  Lipid panel done in July of 2024.    6. Tobacco dependence-patient quit smoking on May 27th 2021.  She is doing well with this.  I have encouraged her to continue with cessation.  No smoking since then    7. Spinal  stenosis-she had surgical repair March 31, 2022.  She still has symptoms of neuropathy in her feet.  She is now willing to try the gabapentin to see if that will help  9/14/23-Neurontin not really helping much.  Although it does help a little bit.  We have discussed doubling up on her dose at bedtime to see if that will help.  She saw provider in [place] and she has a nerve conduction test upcoming.  6/25/24-nerve conduction consistent with polyneuropathy.  Etiology not certain.  She is scared to increase her take any more than 1 tablet the gabapentin for fear of dementia.  We have also discussed amitriptyline.  She wants to think about it.  1/16/25-she inquires about a 10s unit.  We are going to see if we can get 1 for her.  There is some evidence that this helps with neuropathy.  She also inquires about medical marijuana.  We can make a referral to see if that may be an option.    8. Bilateral hand numbness/paresthesias-likely a component of carpal tunnel syndrome.  We have discussed trying wrist splints at bedtime--seems to be doing better    9. Pre diabetes-A1c 5.9%    10. Possible claudication-bilateral lower extremities.  She seems to be doing better and I think most of her symptoms are related to neuropathy    11. Dyspnea on exertion-long history of smoking.  Likely has a component of COPD.  We are going to set her up for PFTs---these showed some small airway disease.    Billing based on 40 minutes of time spent on this encounter      Return to care in 6 months

## 2025-01-28 ENCOUNTER — OFFICE VISIT (OUTPATIENT)
Dept: CARDIOLOGY | Facility: CLINIC | Age: 70
End: 2025-01-28
Payer: MEDICARE

## 2025-01-28 VITALS
SYSTOLIC BLOOD PRESSURE: 110 MMHG | OXYGEN SATURATION: 93 % | WEIGHT: 149.13 LBS | HEIGHT: 62 IN | DIASTOLIC BLOOD PRESSURE: 60 MMHG | HEART RATE: 78 BPM | BODY MASS INDEX: 27.44 KG/M2

## 2025-01-28 DIAGNOSIS — I25.10 CORONARY ARTERY DISEASE INVOLVING NATIVE CORONARY ARTERY OF NATIVE HEART WITHOUT ANGINA PECTORIS: Primary | Chronic | ICD-10-CM

## 2025-01-28 DIAGNOSIS — I10 ESSENTIAL HYPERTENSION: Chronic | ICD-10-CM

## 2025-01-28 DIAGNOSIS — J44.9 CHRONIC OBSTRUCTIVE PULMONARY DISEASE, UNSPECIFIED COPD TYPE: Chronic | ICD-10-CM

## 2025-01-28 DIAGNOSIS — E78.00 HYPERCHOLESTEREMIA: Chronic | ICD-10-CM

## 2025-01-28 DIAGNOSIS — R06.02 SOB (SHORTNESS OF BREATH): Chronic | ICD-10-CM

## 2025-01-28 PROCEDURE — 93005 ELECTROCARDIOGRAM TRACING: CPT | Mod: PBBFAC | Performed by: INTERNAL MEDICINE

## 2025-01-28 PROCEDURE — 93010 ELECTROCARDIOGRAM REPORT: CPT | Mod: S$PBB,,, | Performed by: INTERNAL MEDICINE

## 2025-01-28 PROCEDURE — 99999 PR PBB SHADOW E&M-EST. PATIENT-LVL III: CPT | Mod: PBBFAC,,, | Performed by: INTERNAL MEDICINE

## 2025-01-28 PROCEDURE — 99213 OFFICE O/P EST LOW 20 MIN: CPT | Mod: PBBFAC,25 | Performed by: INTERNAL MEDICINE

## 2025-01-28 PROCEDURE — 99214 OFFICE O/P EST MOD 30 MIN: CPT | Mod: S$PBB,,, | Performed by: INTERNAL MEDICINE

## 2025-01-29 ENCOUNTER — TELEPHONE (OUTPATIENT)
Dept: FAMILY MEDICINE | Facility: CLINIC | Age: 70
End: 2025-01-29
Payer: MEDICARE

## 2025-01-29 DIAGNOSIS — R07.9 CHEST PAIN, UNSPECIFIED TYPE: Primary | ICD-10-CM

## 2025-01-29 LAB
OHS QRS DURATION: 78 MS
OHS QTC CALCULATION: 436 MS

## 2025-01-29 NOTE — TELEPHONE ENCOUNTER
----- Message from Zahira sent at 1/29/2025  4:00 PM CST -----  Regarding: CALL BACK  PLEASE CALL HER BACK SHE IS RETURNING YOUR CALL.

## 2025-01-29 NOTE — TELEPHONE ENCOUNTER
Returned call to number given in call back. This number was incorrect.   Called number in pts chart. No answer. Vm left with call back number

## 2025-01-30 NOTE — PROGRESS NOTES
PCP: Charles Romo DO    Referring Provider:     Subjective:   Melody Foster is a 69 y.o. female with hx of CAD (NSTEMI 5/21), HTN, COPD, bilateral carotid bruits, bilateral LE neuropathy, and HLD who presents for 6 month follow up.     7/15/24--Melody Foster is a 69 y.o. female with hx of CAD (NSTEMI 5/21), HTN, COPD, bilateral carotid bruits, bilateral LE neuropathy, and HLD who presents for 6 month follow up.  C/p SOB with leg weakness.  PFTs done last week ordered by Dr. Romo.     1/10/24--Melody Foster is a 69 y.o. female with hx of CAD (NSTEMI 5/21), HTN, and HLD who presents for 6 month follow up.  Denies any cardiac complaints.       Fhx:  Family History   Problem Relation Name Age of Onset    Anemia Mother      Crohn's disease Mother      Anxiety disorder Mother       Shx:   Social History     Socioeconomic History    Marital status:    Tobacco Use    Smoking status: Former     Current packs/day: 0.00     Average packs/day: 0.5 packs/day for 30.0 years (15.0 ttl pk-yrs)     Types: Cigarettes     Start date: 05/1991     Quit date: 05/2021     Years since quitting: 3.7    Smokeless tobacco: Never   Substance and Sexual Activity    Alcohol use: Never    Drug use: Never     Social Drivers of Health     Financial Resource Strain: Low Risk  (7/10/2024)    Overall Financial Resource Strain (CARDIA)     Difficulty of Paying Living Expenses: Not hard at all   Food Insecurity: No Food Insecurity (7/10/2024)    Hunger Vital Sign     Worried About Running Out of Food in the Last Year: Never true     Ran Out of Food in the Last Year: Never true   Physical Activity: Insufficiently Active (7/10/2024)    Exercise Vital Sign     Days of Exercise per Week: 1 day     Minutes of Exercise per Session: 20 min   Stress: No Stress Concern Present (7/10/2024)    [employer] - Occupational Stress Questionnaire     Feeling of Stress : Not at all   Housing Stability: Unknown (6/7/2021)     Housing Stability Vital Sign     Unable to Pay for Housing in the Last Year: No     Unstable Housing in the Last Year: No       EKG   1/28/25--  7/15/24--NSR, T wave abn, 75 bpm  1/10/24--NSR, T wave abn, 89 bpm  3/28/23--NSR, T wave abn, 74 bpm  8/23/22--normal sinus rhythm, nonspecific T wave abn, 73.       Echo    1/28/22--Interpretation Summary  · The left ventricle is normal in size with mild concentric hypertrophy and  · The estimated ejection fraction is 60%.  · Normal right ventricular size.  · Mild mitral regurgitation.  · Mild tricuspid regurgitation.  · Mild left atrial enlargement.      Cardiac catheterization    5/27/21--Conclusion  · The left ventricular systolic function was normal.  · The pre-procedure left ventricular end diastolic pressure was 20.  · The ejection fraction was calculated to be 65%.  · The RPDA lesion was 70% stenosed.  · The Prox LAD-2 lesion was 95% stenosed.  · The Prox LAD-1 lesion was 95% stenosed with 10% stenosis post-intervention.  · A stent was successfully placed at 12 ORTIZ for 10 sec.  · The estimated blood loss was none.  · There was two vessel coronary artery disease.    The procedure log was documented by Documenter: Lynne Narvaez RN and verified by Denis Antonio MD.    Date: 5/28/2021  Time: 6:03 PM    3/7/11--Severe 2 vessel CAD.  Acute inferior STEMI.  Successful revascularization of the pRCA.  Successful revascularization of the proximal and mid LAD.  No MR.     Lab Results   Component Value Date     06/25/2024    K 4.3 06/25/2024     06/25/2024    CO2 31 06/25/2024    BUN 23 (H) 06/25/2024    CREATININE 1.21 (H) 06/25/2024    CALCIUM 9.0 06/25/2024    ANIONGAP 8 06/25/2024    EGFRNONAA 45 (L) 05/27/2021       Lab Results   Component Value Date    CHOL 134 07/15/2024    CHOL 212 (H) 09/14/2023    CHOL 216 (H) 01/24/2023     Lab Results   Component Value Date    HDL 36 (L) 07/15/2024    HDL 37 (L) 09/14/2023    HDL 37 (L) 01/24/2023      Lab Results   Component Value Date    LDLCALC 51 07/15/2024    LDLCALC 125 09/14/2023    LDLCALC 123 01/24/2023     Lab Results   Component Value Date    TRIG 237 (H) 07/15/2024    TRIG 249 (H) 09/14/2023    TRIG 278 (H) 01/24/2023     Lab Results   Component Value Date    CHOLHDL 3.7 07/15/2024    CHOLHDL 5.7 09/14/2023    CHOLHDL 5.8 01/24/2023       Lab Results   Component Value Date    WBC 5.32 06/25/2024    HGB 10.1 (L) 06/25/2024    HCT 32.3 (L) 06/25/2024    MCV 89.7 06/25/2024     06/25/2024           Current Outpatient Medications:     albuterol (VENTOLIN HFA) 90 mcg/actuation inhaler, Inhale 2 puffs into the lungs every 6 (six) hours as needed for Wheezing. Rescue, Disp: 18 g, Rfl: 5    aspirin (ECOTRIN) 81 MG EC tablet, Take 81 mg by mouth once daily., Disp: , Rfl:     cholecalciferol, vitamin D3, (VITAMIN D3) 50 mcg (2,000 unit) Cap, Take 1 capsule by mouth once daily., Disp: , Rfl:     cyanocobalamin (VITAMIN B-12) 1000 MCG tablet, Take 1,000 mcg by mouth once daily., Disp: , Rfl:     DULoxetine (CYMBALTA) 60 MG capsule, TAKE 1 CAPSULE NIGHTLY, Disp: 90 capsule, Rfl: 3    DUPIXENT  mg/2 mL PnIj, , Disp: , Rfl:     hydrOXYzine HCL (ATARAX) 25 MG tablet, Take 25 mg by mouth every evening., Disp: , Rfl:     losartan-hydrochlorothiazide 100-12.5 mg (HYZAAR) 100-12.5 mg Tab, TAKE 1 TABLET DAILY, Disp: 90 tablet, Rfl: 3    metoprolol succinate (TOPROL-XL) 50 MG 24 hr tablet, TAKE 1 TABLET DAILY, Disp: 90 tablet, Rfl: 3    pantoprazole (PROTONIX) 40 MG tablet, TAKE 1 TABLET DAILY, Disp: 90 tablet, Rfl: 3    triamcinolone acetonide 0.1% (KENALOG) 0.1 % cream, , Disp: , Rfl:     trimethobenzamide (TIGAN) 300 mg capsule, Take 1 capsule (300 mg total) by mouth every 8 (eight) hours as needed., Disp: 30 capsule, Rfl: 3  Did not bring medications.     Review of Systems   Respiratory:  Positive for shortness of breath.    Cardiovascular:  Negative for chest pain, palpitations and leg swelling.  "  Neurological:  Negative for loss of consciousness.           Objective:   /60 (BP Location: Left arm, Patient Position: Sitting)   Pulse 78   Ht 5' 2" (1.575 m)   Wt 67.6 kg (149 lb 1.9 oz)   SpO2 (!) 93%   BMI 27.27 kg/m²     Physical Exam  Constitutional:       General: She is not in acute distress.     Appearance: Normal appearance.   HENT:      Head: Normocephalic and atraumatic.   Neck:      Vascular: No JVD.      Comments: Bilateral carotid bruits, loud  Cardiovascular:      Rate and Rhythm: Normal rate and regular rhythm.      Pulses: Normal pulses.           Radial pulses are 2+ on the right side and 2+ on the left side.        Dorsalis pedis pulses are 2+ on the right side and 2+ on the left side.      Heart sounds: Murmur heard.      Systolic murmur is present with a grade of 2/6.      Comments: II/ VI MICHAEL RUSB  Pulmonary:      Effort: Pulmonary effort is normal.      Breath sounds: Normal breath sounds.   Musculoskeletal:      Right lower leg: No edema.      Left lower leg: No edema.   Skin:     General: Skin is warm and dry.   Neurological:      Mental Status: She is alert.           Assessment:     1. Coronary artery disease involving native coronary artery of native heart without angina pectoris      s/p PTCA 3/11, 5/21      2. Essential hypertension        3. Hypercholesteremia        4. Chronic obstructive pulmonary disease, unspecified COPD type  EKG 12-lead    EKG 12-lead      5. SOB (shortness of breath)              Plan:   Continue current medications  F/u in 6 months.             "

## 2025-01-31 ENCOUNTER — TELEPHONE (OUTPATIENT)
Dept: FAMILY MEDICINE | Facility: CLINIC | Age: 70
End: 2025-01-31
Payer: MEDICARE

## 2025-01-31 NOTE — TELEPHONE ENCOUNTER
----- Message from Zahira sent at 1/30/2025  2:25 PM CST -----  Regarding: CALL BACK  PLEASE RETURN CALL SHE MISSED YOUR CALL [phone]

## 2025-02-04 PROBLEM — R06.02 SOB (SHORTNESS OF BREATH): Chronic | Status: ACTIVE | Noted: 2025-02-04

## 2025-02-25 RX ORDER — TRIMETHOBENZAMIDE HYDROCHLORIDE 300 MG/1
300 CAPSULE ORAL 3 TIMES DAILY PRN
Qty: 270 CAPSULE | Refills: 3 | Status: SHIPPED | OUTPATIENT
Start: 2025-02-25

## 2025-05-13 RX ORDER — DULOXETIN HYDROCHLORIDE 60 MG/1
60 CAPSULE, DELAYED RELEASE ORAL NIGHTLY
Qty: 90 CAPSULE | Refills: 3 | Status: SHIPPED | OUTPATIENT
Start: 2025-05-13

## 2025-05-13 NOTE — TELEPHONE ENCOUNTER
Copied from CRM #[id number]. Topic: Medications - Medication Refill  >> May 13, 2025 10:12 AM Maribel wrote:  Who Called: Melody Foster    Refill or New Rx:Refill  RX Name and Strength:  DULoxetine (CYMBALTA) 60 MG capsule  How is the patient currently taking it? (ex. 1XDay):1xday  Is this a 30 day or 90 day RX:90  Local or Mail Order:mail  List of preferred pharmacies on file (remove unneeded): [unfilled][facility] - [address] [address]  Phone: [phone] Fax: [phone]  Hours: Not open 24 hours  Ordering Provider:Dr Romo    Patient's Preferred Phone Number on File: [phone]

## 2025-05-13 NOTE — TELEPHONE ENCOUNTER
Called to inform pt that we are going to send rx over. No answer. Vm left with this info and a call back number

## 2025-05-14 RX ORDER — METOPROLOL SUCCINATE 50 MG/1
50 TABLET, EXTENDED RELEASE ORAL DAILY
Qty: 90 TABLET | Refills: 3 | Status: SHIPPED | OUTPATIENT
Start: 2025-05-14

## 2025-07-08 RX ORDER — PANTOPRAZOLE SODIUM 40 MG/1
40 TABLET, DELAYED RELEASE ORAL
Qty: 90 TABLET | Refills: 3 | Status: SHIPPED | OUTPATIENT
Start: 2025-07-08

## 2025-07-28 ENCOUNTER — OFFICE VISIT (OUTPATIENT)
Dept: CARDIOLOGY | Facility: CLINIC | Age: 70
End: 2025-07-28
Payer: MEDICARE

## 2025-07-28 VITALS
BODY MASS INDEX: 28.34 KG/M2 | DIASTOLIC BLOOD PRESSURE: 76 MMHG | HEIGHT: 62 IN | OXYGEN SATURATION: 94 % | WEIGHT: 154 LBS | SYSTOLIC BLOOD PRESSURE: 142 MMHG | HEART RATE: 72 BPM

## 2025-07-28 DIAGNOSIS — E78.00 HYPERCHOLESTEREMIA: Chronic | ICD-10-CM

## 2025-07-28 DIAGNOSIS — I25.10 CORONARY ARTERY DISEASE INVOLVING NATIVE CORONARY ARTERY OF NATIVE HEART WITHOUT ANGINA PECTORIS: Primary | Chronic | ICD-10-CM

## 2025-07-28 DIAGNOSIS — J44.9 CHRONIC OBSTRUCTIVE PULMONARY DISEASE, UNSPECIFIED COPD TYPE: Chronic | ICD-10-CM

## 2025-07-28 DIAGNOSIS — I10 ESSENTIAL HYPERTENSION: Chronic | ICD-10-CM

## 2025-07-28 PROCEDURE — 99213 OFFICE O/P EST LOW 20 MIN: CPT | Mod: PBBFAC,25 | Performed by: INTERNAL MEDICINE

## 2025-07-28 PROCEDURE — 93010 ELECTROCARDIOGRAM REPORT: CPT | Mod: S$PBB,,, | Performed by: INTERNAL MEDICINE

## 2025-07-28 PROCEDURE — 99999 PR PBB SHADOW E&M-EST. PATIENT-LVL III: CPT | Mod: PBBFAC,,, | Performed by: INTERNAL MEDICINE

## 2025-07-28 PROCEDURE — 93005 ELECTROCARDIOGRAM TRACING: CPT | Mod: PBBFAC | Performed by: INTERNAL MEDICINE

## 2025-07-28 PROCEDURE — 99214 OFFICE O/P EST MOD 30 MIN: CPT | Mod: S$PBB,,, | Performed by: INTERNAL MEDICINE

## 2025-07-29 LAB
OHS QRS DURATION: 80 MS
OHS QTC CALCULATION: 387 MS

## 2025-07-29 NOTE — PROGRESS NOTES
PCP: Charles Romo DO    Referring Provider:     Subjective:   Melody Foster is a 70 y.o. female with hx of CAD (NSTEMI 5/21), HTN, COPD, bilateral carotid bruits, bilateral LE neuropathy, and HLD who presents for 6 month follow up.       Fhx:  Family History   Problem Relation Name Age of Onset    Anemia Mother      Crohn's disease Mother      Anxiety disorder Mother       Shx: Social History[1]    EKG   7/28/25--NSR, rightward axis, ST and T wave agn, 76 bpm  7/15/24--NSR, T wave abn, 75 bpm  1/10/24--NSR, T wave abn, 89 bpm  3/28/23--NSR, T wave abn, 74 bpm  8/23/22--normal sinus rhythm, nonspecific T wave abn, 73.        ECHO Results for orders placed during the hospital encounter of 01/28/22    Echo    Interpretation Summary  · The left ventricle is normal in size with mild concentric hypertrophy and  · The estimated ejection fraction is 60%.  · Normal right ventricular size.  · Mild mitral regurgitation.  · Mild tricuspid regurgitation.  · Mild left atrial enlargement.    [facility] Results for orders placed during the hospital encounter of 05/27/21    Cardiac catheterization    Conclusion  · The left ventricular systolic function was normal.  · The pre-procedure left ventricular end diastolic pressure was 20.  · The ejection fraction was calculated to be 65%.  · The RPDA lesion was 70% stenosed.  · The Prox LAD-2 lesion was 95% stenosed.  · The Prox LAD-1 lesion was 95% stenosed with 10% stenosis post-intervention.  · A stent was successfully placed at 12 ORTIZ for 10 sec.  · The estimated blood loss was none.  · There was two vessel coronary artery disease.    The procedure log was documented by Documenter: Lynne Narvaez RN and verified by Denis Antonio MD.    Date: 5/28/2021  Time: 6:03 PM    3/7/11--Severe 2 vessel CAD.  Acute inferior STEMI.  Successful revascularization of the pRCA.  Successful revascularization of the proximal and mid LAD.  No MR.     Lab Results   Component Value Date      06/25/2024    K 4.3 06/25/2024     06/25/2024    CO2 31 06/25/2024    BUN 23 (H) 06/25/2024    CREATININE 1.21 (H) 06/25/2024    CALCIUM 9.0 06/25/2024    ANIONGAP 8 06/25/2024    EGFRNONAA 45 (L) 05/27/2021       Lab Results   Component Value Date    CHOL 134 07/15/2024    CHOL 212 (H) 09/14/2023    CHOL 216 (H) 01/24/2023     Lab Results   Component Value Date    HDL 36 (L) 07/15/2024    HDL 37 (L) 09/14/2023    HDL 37 (L) 01/24/2023     Lab Results   Component Value Date    LDLCALC 51 07/15/2024    LDLCALC 125 09/14/2023    LDLCALC 123 01/24/2023     Lab Results   Component Value Date    TRIG 237 (H) 07/15/2024    TRIG 249 (H) 09/14/2023    TRIG 278 (H) 01/24/2023     Lab Results   Component Value Date    CHOLHDL 3.7 07/15/2024    CHOLHDL 5.7 09/14/2023    CHOLHDL 5.8 01/24/2023       Lab Results   Component Value Date    WBC 5.32 06/25/2024    HGB 10.1 (L) 06/25/2024    HCT 32.3 (L) 06/25/2024    MCV 89.7 06/25/2024     06/25/2024         Current Medications[2]  Did not bring medications.     Review of Systems   Respiratory:  Negative for shortness of breath.    Cardiovascular:  Negative for chest pain, palpitations and leg swelling.   Neurological:  Negative for loss of consciousness.       History of Present Illness    CHIEF COMPLAINT:  Patient presents today for follow up    NEUROPATHY:  She is currently under care of Dr. Jenkins for neuropathy investigation. She describes symptoms as sensation of ants crawling in hands and fingers, particularly severe at night. Symptoms are persistent and causing significant discomfort. Recent diagnostic testing includes 10 tubes of labs in [place] and a 3-hour glucose tolerance test. She is awaiting comprehensive test results to determine underlying cause.    CARDIOVASCULAR:  She reports a murmur since childhood, though she is uncertain whether it has persisted or changed over time. Multiple medical professionals have detected the murmur during exams.  "She understands it is not life-threatening and expresses no concerns.    HYPERLIPIDEMIA:  Her cholesterol was 51 in July last year, meeting  standards. She manages her cholesterol with turmeric and moringa supplements and is motivated to continue this approach.      Objective:   BP (!) 142/76 (BP Location: Left arm, Patient Position: Sitting)   Pulse 72   Ht 5' 2" (1.575 m)   Wt 69.9 kg (154 lb)   SpO2 (!) 94%   BMI 28.17 kg/m²     Physical Exam  Vitals reviewed.   Constitutional:       General: She is not in acute distress.     Appearance: Normal appearance.   HENT:      Head: Normocephalic and atraumatic.   Neck:      Vascular: No carotid bruit or JVD.   Cardiovascular:      Rate and Rhythm: Normal rate and regular rhythm.      Pulses: Normal pulses.           Radial pulses are 2+ on the right side and 2+ on the left side.      Heart sounds: Murmur heard.      Systolic murmur is present with a grade of 2/6.      Comments: II/ VI MICHAEL  Pulmonary:      Effort: Pulmonary effort is normal.      Breath sounds: Normal breath sounds.   Musculoskeletal:      Right lower leg: No edema.      Left lower leg: No edema.   Skin:     General: Skin is warm and dry.   Neurological:      Mental Status: She is alert.             Assessment:     1. Coronary artery disease involving native coronary artery of native heart without angina pectoris      s/p MI 5/21      2. Essential hypertension  EKG 12-lead    EKG 12-lead      3. Hypercholesteremia        4. Chronic obstructive pulmonary disease, unspecified COPD type            Assessment & Plan    I10 Essential hypertension    IMPRESSION:  - Initiated Zetia to further optimize lipid profile (to be called in to pharmacy).  - Will check LP(a) levels, as this may be contributing to familial heart disease risk despite normal cholesterol.  - Continued current cholesterol medication.    ESSENTIAL HYPERTENSION:  - Explained significance of LP(a) in heart disease risk and potential " future treatment options.  - Initiated Zetia to further optimize lipid profile (to be called in to pharmacy).  - Continued current cholesterol medication.  - Ordered lipid panel with LP(a) in 6-8 weeks.  - Patient to continue current efforts in maintaining healthy diet and weight, maintain current exercise regimen.  - Follow up in 6 months.  - Contact the office if any problems arise before next scheduled visit.         Plan:   Lipids, copy to chart, done with Dr. Dunlap in [place]  F/u in 6 months.       This note was generated with the assistance of ambient listening technology. Verbal consent was obtained by the patient and accompanying visitor(s) for the recording of patient appointment to facilitate this note. I attest to having reviewed and edited the generated note for accuracy, though some syntax or spelling errors may persist. Please contact the author of this note for any clarification.             [1]   Social History  Socioeconomic History    Marital status:    Tobacco Use    Smoking status: Former     Current packs/day: 0.00     Average packs/day: 0.5 packs/day for 30.0 years (15.0 ttl pk-yrs)     Types: Cigarettes     Start date: 1991     Quit date: 2021     Years since quittin.2    Smokeless tobacco: Never   Substance and Sexual Activity    Alcohol use: Never    Drug use: Never     Social Drivers of Health     Financial Resource Strain: Low Risk  (7/10/2024)    Overall Financial Resource Strain (CARDIA)     Difficulty of Paying Living Expenses: Not hard at all   Food Insecurity: No Food Insecurity (7/10/2024)    Hunger Vital Sign     Worried About Running Out of Food in the Last Year: Never true     Ran Out of Food in the Last Year: Never true   Physical Activity: Insufficiently Active (7/10/2024)    Exercise Vital Sign     Days of Exercise per Week: 1 day     Minutes of Exercise per Session: 20 min   Stress: No Stress Concern Present (7/10/2024)    [employer] - Occupational Stress Questionnaire     Feeling of Stress : Not at all   Housing Stability: Unknown (6/7/2021)    Housing Stability Vital Sign     Unable to Pay for Housing in the Last Year: No     Unstable Housing in the Last Year: No   [2]   Current Outpatient Medications:     albuterol (VENTOLIN HFA) 90 mcg/actuation inhaler, Inhale 2 puffs into the lungs every 6 (six) hours as needed for Wheezing. Rescue, Disp: 18 g, Rfl: 5    aspirin (ECOTRIN) 81 MG EC tablet, Take 81 mg by mouth once daily., Disp: , Rfl:     cholecalciferol, vitamin D3, (VITAMIN D3) 50 mcg (2,000 unit) Cap, Take 1 capsule by mouth once daily., Disp: , Rfl:     cyanocobalamin (VITAMIN B-12) 1000 MCG tablet, Take 1,000 mcg by mouth once daily., Disp: , Rfl:     DULoxetine (CYMBALTA) 60 MG capsule, Take 1 capsule (60 mg total) by mouth every evening., Disp: 90 capsule, Rfl: 3    DUPIXENT  mg/2 mL PnIj, , Disp: , Rfl:     hydrOXYzine HCL (ATARAX) 25 MG tablet, Take 25 mg by mouth every evening., Disp: , Rfl:     losartan-hydrochlorothiazide 100-12.5 mg (HYZAAR) 100-12.5 mg Tab, TAKE 1 TABLET DAILY, Disp: 90 tablet, Rfl: 3    metoprolol succinate (TOPROL-XL) 50 MG 24 hr tablet, Take 1 tablet (50 mg total) by mouth once daily., Disp: 90 tablet, Rfl: 3    pantoprazole (PROTONIX) 40 MG tablet, TAKE 1 TABLET DAILY, Disp: 90 tablet, Rfl: 3    triamcinolone acetonide 0.1% (KENALOG) 0.1 % cream, , Disp: , Rfl:     trimethobenzamide (TIGAN) 300 mg capsule, Take 1 capsule (300 mg total) by mouth 3 (three) times daily as needed., Disp: 270 capsule, Rfl: 3

## 2025-08-06 ENCOUNTER — PATIENT MESSAGE (OUTPATIENT)
Facility: HOSPITAL | Age: 70
End: 2025-08-06
Payer: MEDICARE

## (undated) DEVICE — CATH DIAG OPTITORQUE 5FR TIGER 110CM

## (undated) DEVICE — CATHETER IMAGING IVUS EAGLE EYE PLATINUM

## (undated) DEVICE — SYRINGE 150CC INJECTABLE POWER

## (undated) DEVICE — GLIDESHEATH SLENDER INTRODUCER 6FR

## (undated) DEVICE — GLOVE SURGICAL PROTEXIS PI SIZE 6.5

## (undated) DEVICE — CDS ANGIOGRAPHY PACK

## (undated) DEVICE — APPLICATOR CHLORAPREP LITE ORANGE 10.5ML STERILE

## (undated) DEVICE — CATH IMPULSE 5FR PIGTAIL

## (undated) DEVICE — CATH BLLN FG APEX MR 2.50X12MM

## (undated) DEVICE — TUBING CAPNOLINE PLUS SMART 02 CONNECTOR(ORDER 65110)

## (undated) DEVICE — DRESSING IV TEGADERM 10X12CM

## (undated) DEVICE — SET IV EXTENSION 42IN W/2 PORT CLEARLINK

## (undated) DEVICE — GUIDEWIRE EXCHANGE J TIP .035X260CM

## (undated) DEVICE — GUIDEWIRE RUNTHROUGH NS 180CM CORONARY

## (undated) DEVICE — GLOVE SURGICAL PROTEXIS PI SIZE 7

## (undated) DEVICE — SENSOR PULSE OX ADULT

## (undated) DEVICE — DEVICE INFLATION BLUE DIAMOND IN7112

## (undated) DEVICE — DEVICE BLEEDBACK CONTROL VALVE COPILOT

## (undated) DEVICE — ISOVUE 370 100ML

## (undated) DEVICE — CATH NC QUANTUM APEX MR 4X12

## (undated) DEVICE — POSITIONER ULNAR NERVE

## (undated) DEVICE — DEVICE RADIAL TR BAND REG

## (undated) DEVICE — KIT ANGIO MANIFOLD CUSTOM RFH LEFT HEART KIT

## (undated) DEVICE — CANNULA OXYGEN CURVED NON-FLAIR TIP W/TUBING

## (undated) DEVICE — Device

## (undated) DEVICE — SYSTEM BACK STOP CLOSED WASTE

## (undated) DEVICE — BAG-A-JET FLUID DISPENSER SYSTEM